# Patient Record
Sex: MALE | Race: WHITE | NOT HISPANIC OR LATINO | URBAN - METROPOLITAN AREA
[De-identification: names, ages, dates, MRNs, and addresses within clinical notes are randomized per-mention and may not be internally consistent; named-entity substitution may affect disease eponyms.]

---

## 2024-06-10 PROBLEM — E66.3 OVERWEIGHT (BMI 25.0-29.9): Status: ACTIVE | Noted: 2024-06-10

## 2024-07-01 ENCOUNTER — LAB VISIT (OUTPATIENT)
Dept: LAB | Facility: HOSPITAL | Age: 74
End: 2024-07-01
Attending: NURSE PRACTITIONER
Payer: MEDICARE

## 2024-07-01 ENCOUNTER — OFFICE VISIT (OUTPATIENT)
Dept: NEUROLOGY | Facility: CLINIC | Age: 74
End: 2024-07-01
Payer: MEDICARE

## 2024-07-01 ENCOUNTER — TELEPHONE (OUTPATIENT)
Dept: NEUROLOGY | Facility: CLINIC | Age: 74
End: 2024-07-01
Payer: MEDICARE

## 2024-07-01 VITALS
RESPIRATION RATE: 17 BRPM | BODY MASS INDEX: 28.8 KG/M2 | WEIGHT: 190.06 LBS | SYSTOLIC BLOOD PRESSURE: 119 MMHG | HEART RATE: 59 BPM | DIASTOLIC BLOOD PRESSURE: 75 MMHG | HEIGHT: 68 IN

## 2024-07-01 DIAGNOSIS — R41.3 OTHER AMNESIA: ICD-10-CM

## 2024-07-01 DIAGNOSIS — R25.1 TREMOR OF RIGHT HAND: ICD-10-CM

## 2024-07-01 DIAGNOSIS — R25.1 TREMOR OF RIGHT HAND: Primary | ICD-10-CM

## 2024-07-01 DIAGNOSIS — R41.3 MEMORY LOSS: ICD-10-CM

## 2024-07-01 DIAGNOSIS — G20.C PARKINSONISM, UNSPECIFIED PARKINSONISM TYPE: ICD-10-CM

## 2024-07-01 DIAGNOSIS — G91.2 NPH (NORMAL PRESSURE HYDROCEPHALUS): ICD-10-CM

## 2024-07-01 DIAGNOSIS — G47.61 PLMD (PERIODIC LIMB MOVEMENT DISORDER): ICD-10-CM

## 2024-07-01 LAB
AMMONIA PLAS-SCNC: 20 UMOL/L (ref 10–50)
FOLATE SERPL-MCNC: 26.2 NG/ML (ref 4–24)
HIV 1+2 AB+HIV1 P24 AG SERPL QL IA: NORMAL

## 2024-07-01 PROCEDURE — 82525 ASSAY OF COPPER: CPT | Performed by: NURSE PRACTITIONER

## 2024-07-01 PROCEDURE — 99999 PR PBB SHADOW E&M-EST. PATIENT-LVL IV: CPT | Mod: PBBFAC,,, | Performed by: NURSE PRACTITIONER

## 2024-07-01 PROCEDURE — 82140 ASSAY OF AMMONIA: CPT | Performed by: NURSE PRACTITIONER

## 2024-07-01 PROCEDURE — 82746 ASSAY OF FOLIC ACID SERUM: CPT | Performed by: NURSE PRACTITIONER

## 2024-07-01 PROCEDURE — 83921 ORGANIC ACID SINGLE QUANT: CPT | Performed by: NURSE PRACTITIONER

## 2024-07-01 PROCEDURE — 86592 SYPHILIS TEST NON-TREP QUAL: CPT | Performed by: NURSE PRACTITIONER

## 2024-07-01 PROCEDURE — 87389 HIV-1 AG W/HIV-1&-2 AB AG IA: CPT | Performed by: NURSE PRACTITIONER

## 2024-07-01 PROCEDURE — 99204 OFFICE O/P NEW MOD 45 MIN: CPT | Mod: S$PBB,,, | Performed by: NURSE PRACTITIONER

## 2024-07-01 PROCEDURE — 84425 ASSAY OF VITAMIN B-1: CPT | Performed by: NURSE PRACTITIONER

## 2024-07-01 PROCEDURE — 99214 OFFICE O/P EST MOD 30 MIN: CPT | Mod: PBBFAC,PO | Performed by: NURSE PRACTITIONER

## 2024-07-01 NOTE — PROGRESS NOTES
NEUROLOGY  Outpatient Consultation Visit     [facility]  [address] [facility], [place] [address]  [phone] (office) / [phone] (fax)    Patient Name:  Donaldo Ferrara  :  1950  MR #:  [id number]  Acct #:  [id number]    Date of Neurology Consult: 2024  Name of Provider: JUAN CARLOS Tolbert    Other Physicians:  John Purdy MD (Primary Care Physician); John Purdy MD (Referring)      Chief Complaint: Tremors      History of Present Illness (HPI):  Donaldo Ferrara is a right handed  74 y.o. male with a PMHX of HLD, MCI, NPH s/p shunt in May, ,     Patient presents today for tremor. It is to his right hand and started ~ . The tremor occurs at rest and with action. It has occurred when he uses his hand for instance, when driving or performing an action. There is no family history of tremor. He does not drink alcohol or caffeine. He hasn't noticed if his writing is affected by the tremor.    Previous records reviewed and it was originally believed that his tremor began after the initiation of Celexa.   He was also diagnosed with PLMS  and started on CD/LD 25/100 at night only in . This reportedly did offer a lot of aid to his sleep. At one point CD/LD was increased to 50/200 at bedtime. This also reportedly aided in his RUE tremor. At one point in  he was also prescribed Klonopin for RLS. He is no longer taking this med as it was causing nightmares.     Both patient and spouse didn't notice a change in the tremor while on CD/LD despite what previous Neuro records report. Previous neuro notes did report that he was also taking Aricept at one point which enhanced vivid dreams.     Of note, he is s/p shunt in May by Dr. Martinez. Spouse states he was having urinary incontinence and shuffling gait. Since the shunt he has had improvement in these symptoms.     He also reportedly had cognitive testing by previous neurologist and was told he had moderate  "neurocognitive disorder. He was placed on Namenda in the last year by his this neurologist. He does fall asleep easily. He thinks he sleeps well at night. He did have a sleep study in the past and it was reportedly normal. He does endorse vivid dreams. The RBD has improved since coming off the CD/LD but he continues to have it on occasion.  He fell out of his bed in March due to RBD.   Once in a while he may think he hears a noise but denies visual hallucinations.  He has been in therapy for the last 2-3 weeks for balance and gait training.         Past Medical, Surgical, Family & Social History:   Past Medical History:   Diagnosis Date    a Family H/O Early CAD     On ASA 81 Mg Daily; His Sister And Brother    c Hypercholesterolemia     On Zocor 20 Mg qHS    d Family H/O Diabetes Mellitus     His Sister And Brother    f Overweight     j Diverticulosis     j H/O Colon Polyp On 2019 Colonoscopy     k Benign Prostatic Hyperplasia     m Impaired balance     6/10/24 Referred To [facility]patient PT    m Mild Cognitive Impairment     6/10/24 ReferredTo [facility]; On Namenda For This    m Normal Pressure Hydrocephalus S/P  Shunt In 05/2024     Dr. Niranjan Martinez On 5/20/24 With A "MedTronic StrataMR II Adjustable Valve" At [facility] In [place]    m Restless Legs Syndrome     m Right Hand Tremor     6/10/24 ReferredTo [facility]; Better After His  Shunt    n Dysthymia     On Celexa 20 Mg Daily    q Eczema      History reviewed. No pertinent surgical history.  No family history on file.  Alcohol use:  has no history on file for alcohol use.   (Of note, 0.6 oz = 1 beer or 6 oz = 10 beers).  Tobacco use:  reports that he quit smoking about 20 years ago. His smoking use included cigarettes. He has never used smokeless tobacco.  Street drug use:  has no history on file for drug use.  Allergies: Patient has no known allergies..    Home Medications:     Current Outpatient Medications:     " "aspirin (ECOTRIN) 81 MG EC tablet, Take 1 tablet (81 mg total) by mouth once daily., Disp: , Rfl:     citalopram (CELEXA) 20 MG tablet, Take 1 tablet (20 mg total) by mouth once daily. TAKES 1.5 TABLETS DAILY, Disp: 90 tablet, Rfl: 3    memantine (NAMENDA) 10 MG Tab, Take 1 tablet (10 mg total) by mouth 2 (two) times daily., Disp: 180 tablet, Rfl: 3    multivitamin with minerals tablet, Take 1 tablet by mouth once daily., Disp: , Rfl:     simvastatin (ZOCOR) 20 MG tablet, Take 1 tablet (20 mg total) by mouth once daily., Disp: 90 tablet, Rfl: 3    Physical Examination:  /75 (BP Location: Left arm, Patient Position: Sitting, BP Method: Small (Automatic))   Pulse (!) 59   Resp 17   Ht 5' 8" (1.727 m)   Wt 86.2 kg (190 lb 0.6 oz)   BMI 28.89 kg/m²     GENERAL:  General appearance: Well, non-toxic appearing.  No apparent distress.  Neck: supple.    MENTAL STATUS:  Alertness, attention span & concentration: normal.  Language: normal.  Orientation to self, place & time:  normal.  Memory, recent & remote: normal.  Fund of knowledge: normal.      SPEECH:  Clear and fluent.   - monotone  Follows complex commands.      CRANIAL NERVES:  Cranial Nerves II-XII were examined.  II - Visual fields: normal.  III, IV, VI: PERRL, EOMI, No ptosis, No nystagmus.  V - Facial sensation: normal.  VII - Face symmetry & mobility: normal.  VIII - Hearing: normal  IX, X - Palate: mobile & midline.  XI - Shoulder shrug: normal.  XII - Tongue protrusion: normal.        GROSS MOTOR:  Gait & station: able to rise from chair with arms crossed over chest  Tone: no cogwheel to arms; unable to relax legs  Arms to Core: no tremor   Arms extended: no tremor  Abnormal movements: none.  Finger-nose: normal.  Rapid alternating movements: no decrementing finger or foot taps; very mild tremor to right hand during left finger taps.   Pronator drift: normal      MUSCLE STRENGTH:   RIGHT    LEFT   5 Neck Ext. 5   5 Neck Flex 5   5 Deltoids 5   5 " Biceps 5   5 Triceps 5   5 Forearm.Pr. 5        5 Iliopsoas flex    5   5 Hip Abduct 5   5 Hip Adduct 5   5 Quads 5   5 Hams 5   5 Dorsiflex 5   5 Plantar Flex 5     REFLEXES:    RIGHT Reflex   LEFT   2+ Biceps 2+   2+ Brachiorad. 2+        2+ Patellar 2+     SENSORY:  Light touch: Normal throughout.          III.  MOTOR EXAMINATION        Speech  2 - Monotone, slurred but understandable; moderately impaired.   Facial Expression  0 - Normal.   Tremor at Rest:      Face, lips, chin 0 - Absent.    Hands:      right 0 - Absent.    left 0 - Absent.    Feet:      right 0 - Absent.    left 0 - Absent.    Action or Postural Tremor of Hands      right 0 - Absent.    left 0 - Absent.    Rigidity      Neck 0 - Absent.   Upper Extremity: Right 0 - Absent.   Upper Extremity: Left 0 - Absent.   Lower Extremity: Right 1 - Slight or detectable only when activated by mirror or other movements.   Lower Extremity: Left 1 - Slight or detectable only when activated by mirror or other movements.   Finger Taps      right 0 - Normal.   left 0 - Normal.   Hand Movements      right 0 - Normal.   left 0 - Normal.   Rapid Alternating Movements of Hands      right 0 - Normal.   left 0 - Normal.   Leg Agility      right 0 - Normal.   left 0 - Normal.   Arising from Chair  0 - Normal.   Posture  0 - Normal erect.   Gait  0 - Normal.   Postural Stability (Response to sudden, strong posterior displacement produced by pull on shoulders while patient erect with eyes open and feet slightly apart. Patient is prepared, and can have had some practice runs.)  0 - Normal.   Body Bradykinesia and Hypokinesia (Combining slowness, hesitancy, decreased armswing, small amplitude, and poverty of movement in general)  1 - Minimal slowness, giving movement a deliberate character; could be normal for some persons. Possibly reduced amplitude.               Diagnostic Data Reviewed:   I have personally reviewed provider notes, labs and imaging made available to me  "today.     Imaging:  MRI brain 2021 (outside):  FINDINGS:   Brain: The brain is normal in morphology and signal intensity for the   patient's age. There is a mild amount of non-specific white matter T2   hyperintensity, likely related to chronic age-related small vessel changes.   No restricted diffusion.   Ventricular system: Normal.   Extra-axial spaces: Normal.   Posterior fossa: Normal.   Flow-voids: Normal.   Calvarium and skull base: No significant abnormalities.   Orbits: The globes are normal in appearance. No abnormality of the optic   nerve sheath or extraocular muscles. The lacrimal gland is unremarkable. The   cavernous sinus regions appear normal.   Paranasal sinuses and extracranial soft tissues: Unremarkable.   Following contrast administration, there is no pathological enhancement.     Trino scan 4/2024:  Normal (see encounters for full report)    Cardiac:  CUS 6/2024:  Impression:     No evidence of a hemodynamically significant carotid stenosis.  Labs:  No results found for: "WBC", "HGB", "HCT", "PLT", "MCV", "RDW"  No results found for: "NA", "K", "CL", "CO2", "BUN", "CREATININE", "GLU", "CALCIUM", "MG", "PHOS"  No results found for: "PROT", "ALBUMIN", "BILITOT", "AST", "ALKPHOS", "ALT", "GGT"  No results found for: "INR", "PROTIME", "PTT"  No results found for: "CHOL", "HDL", "LDLCALC", "TRIG", "CHOLHDL"  No results found for: "LABA1C", "HGBA1C"   No results found for: "KABSHGZE83"  Lab Results   Component Value Date    FOLATE 26.2 (H) 07/01/2024     No results found for: "TSH"  No results found for: "VITAMINB1"  Lab Results   Component Value Date    RPR Non-reactive 07/01/2024     No results found for: "PRICILA"  No components found for: "HEPATITISCANTIBODY"  No components found for: "HIV 1/2 AG/AB"  No results found for: "CRP"  No components found for: "SEDIMENTATIONRATE"      Assessment and Plan:  Donaldo Ferrara is a 74 y.o. male.      Problem List Items Addressed This Visit          Neuro    Right " Hand Tremor - Primary    Current Assessment & Plan     Reports of right hand tremor dating back to ~ 2018   - intermittent, sporadic; occurs with action and rest    Previous neuro records report initiation of CD/LD in 2020 to aid with periodic limb movement. This was eventually increased overtime to also aid in hand tremor. CD/LD was reportedly discontinued ~ 2023  Neuro exam today without tremor appreciated.   Etiology unclear   - Parkinsonism may be in DDX (mild bradykinesias and monotone voice).  Recent Trino scan normal   It is possible that other Dx may mimic PD-isms   - NPH s/p shunt placement in May 2024   - neurodegenerative disorder?  Hold on medication for now as tremor does not impact ADLs   - can consider gold standard ET med?           Normal Pressure Hydrocephalus S/P  Shunt In 05/2024    Current Assessment & Plan     Improvement of shuffling gait and urinary incontinence          Parkinsonism, unspecified Parkinsonism type    Memory loss    Current Assessment & Plan     Reported to have cognitive impairment by previous neurologist  Little improvement in cognition following shunt placement  No reports of behavorial changes, hallucinations. He does have a history of depression and takes Celexa. He also has been diagnosed with PLMS as well as RBD.   Will complete formal memory eval at next visit  In meantime, obtain updated brain imaging, serologies and referral placed to Neuro psych for further testing.   Pt was tried on Aricept in the past but was intolerable to it (vivid dreams)  Now maintained on Namenda   - continue             Other    PLMD (periodic limb movement disorder)    Current Assessment & Plan       Has been tried on CD/LD and klonopin. Currently not on either  PSG?  Consider Vickie or Lyrica             Other Visit Diagnoses       Other amnesia                          Important to note, also  has a past medical history of a Family H/O Early CAD, c Hypercholesterolemia, d Family H/O  Diabetes Mellitus, f Overweight, j Diverticulosis, j H/O Colon Polyp On 2019 Colonoscopy, k Benign Prostatic Hyperplasia, m Impaired balance, m Mild Cognitive Impairment, m Normal Pressure Hydrocephalus S/P  Shunt In 05/2024, m Restless Legs Syndrome, m Right Hand Tremor, n Dysthymia, and q Eczema.            The patient will return to clinic in 2 months.        All questions were answered and patient is comfortable with the plan.       Thank you very much for the opportunity to assist in this patient's care.    If you have any questions or concerns, please do not hesitate to contact me at any time.    Sincerely,     JUAN CARLOS Tolbert  [facility] [place]         I spent a total of 50 minutes on the day of the visit.This includes face to face time and non-face to face time preparing to see the patient (eg, review of tests), Obtaining and/or reviewing separately obtained history, Documenting clinical information in the electronic or other health record, Independently interpreting resultsand communicating results to the patient/family/caregiver, or Care coordination.

## 2024-07-02 LAB — RPR SER QL: NORMAL

## 2024-07-03 ENCOUNTER — PATIENT MESSAGE (OUTPATIENT)
Dept: NEUROLOGY | Facility: CLINIC | Age: 74
End: 2024-07-03
Payer: MEDICARE

## 2024-07-05 LAB
COPPER SERPL-MCNC: 1035 UG/L (ref 665–1480)
METHYLMALONATE SERPL-SCNC: 0.26 UMOL/L

## 2024-07-08 ENCOUNTER — HOSPITAL ENCOUNTER (OUTPATIENT)
Dept: RADIOLOGY | Facility: HOSPITAL | Age: 74
Discharge: HOME | End: 2024-07-08
Attending: NURSE PRACTITIONER
Payer: MEDICARE

## 2024-07-08 DIAGNOSIS — Z98.2 S/P VP SHUNT: ICD-10-CM

## 2024-07-08 DIAGNOSIS — R41.3 OTHER AMNESIA: ICD-10-CM

## 2024-07-08 DIAGNOSIS — R41.3 MEMORY LOSS: ICD-10-CM

## 2024-07-08 PROBLEM — G47.61 PLMD (PERIODIC LIMB MOVEMENT DISORDER): Status: ACTIVE | Noted: 2024-07-08

## 2024-07-08 PROBLEM — G20.C PARKINSONISM, UNSPECIFIED PARKINSONISM TYPE: Status: ACTIVE | Noted: 2024-07-08

## 2024-07-08 PROCEDURE — 76000 FLUOROSCOPY <1 HR PHYS/QHP: CPT | Mod: TC,PO

## 2024-07-08 PROCEDURE — 70551 MRI BRAIN STEM W/O DYE: CPT | Mod: TC,PO

## 2024-07-08 PROCEDURE — 70551 MRI BRAIN STEM W/O DYE: CPT | Mod: 26,,, | Performed by: RADIOLOGY

## 2024-07-08 NOTE — ASSESSMENT & PLAN NOTE
Has been tried on CD/LD and klonopin. Currently not on either  PSG?  Consider Vickie or Lyrica

## 2024-07-08 NOTE — ASSESSMENT & PLAN NOTE
Reports of right hand tremor dating back to ~ 2018   - intermittent, sporadic; occurs with action and rest    Previous neuro records report initiation of CD/LD in 2020 to aid with periodic limb movement. This was eventually increased overtime to also aid in hand tremor. CD/LD was reportedly discontinued ~ 2023  Neuro exam today without tremor appreciated.   Etiology unclear   - Parkinsonism may be in DDX (mild bradykinesias and monotone voice).  Recent Trino scan normal   It is possible that other Dx may mimic PD-isms   - NPH s/p shunt placement in May 2024   - neurodegenerative disorder?  Hold on medication for now as tremor does not impact ADLs   - can consider gold standard ET med?

## 2024-07-08 NOTE — ASSESSMENT & PLAN NOTE
Reported to have cognitive impairment by previous neurologist  Little improvement in cognition following shunt placement  No reports of behavorial changes, hallucinations. He does have a history of depression and takes Celexa. He also has been diagnosed with PLMS as well as RBD.   Will complete formal memory eval at next visit  In meantime, obtain updated brain imaging, serologies and referral placed to Neuro psych for further testing.   Pt was tried on Aricept in the past but was intolerable to it (vivid dreams)  Now maintained on Namenda   - continue

## 2024-09-03 NOTE — PROGRESS NOTES
NEUROLOGY  Outpatient Follow Up    [facility]  [address], [place] [address]  [phone] (office) / [phone] (fax)    Patient Name:  Donaldo Ferrara  :  1950  MR #:  [id number]  Acct #:  [id number]    Date of Neurology Visit: 2024  Name of Provider: JUAN CARLOS Tolbert    Other Physicians:  John Purdy MD (Primary Care Physician); No ref. provider found (Referring)      Chief Complaint: Tremors      History of Present Illness (HPI):  Donaldo Ferrara is a right handed  74 y.o. male with a PMHX of HLD, MCI, NPH s/p shunt in May, ,      Patient presents today for tremor. It is to his right hand and started ~ . The tremor occurs at rest and with action. It has occurred when he uses his hand for instance, when driving or performing an action. There is no family history of tremor. He does not drink alcohol or caffeine. He hasn't noticed if his writing is affected by the tremor.    Previous records reviewed and it was originally believed that his tremor began after the initiation of Celexa.   He was also diagnosed with PLMS  and started on CD/LD 25/100 at night only in . This reportedly did offer a lot of aid to his sleep. At one point CD/LD was increased to 50/200 at bedtime. This also reportedly aided in his RUE tremor. At one point in  he was also prescribed Klonopin for RLS. He is no longer taking this med as it was causing nightmares.      Both patient and spouse didn't notice a change in the tremor while on CD/LD despite what previous Neuro records report. Previous neuro notes did report that he was also taking Aricept at one point which enhanced vivid dreams.      Of note, he is s/p shunt in May by Dr. Martinez. Spouse states he was having urinary incontinence and shuffling gait. Since the shunt he has had improvement in these symptoms.      He also reportedly had cognitive testing by previous neurologist and was told he had moderate  neurocognitive disorder. He was placed on Namenda in the last year by his this neurologist. He does fall asleep easily. He thinks he sleeps well at night. He did have a sleep study in the past and it was reportedly normal. He does endorse vivid dreams. The RBD has improved since coming off the CD/LD but he continues to have it on occasion.  He fell out of his bed in March due to RBD.   Once in a while he may think he hears a noise but denies visual hallucinations.  He has been in therapy for the last 2-3 weeks for balance and gait training.              Interval Hx 9/4/2024:  Patient presents today for follow up. Overall patient has been feeling well. Tremor has not changed and is stable since last evaluated. Tremor is mainly noticed when emotional. It continues not to impede on ADLs. He continues to take Celexa which is offering aid in his mood.   He just finished PT for balance. He is now doing home exercises.   There are no reported hallucinations or RBD. He fell out of his bed in March due to RBD but no events since. Spouse states this has improved significantly.                  Past Medical, Surgical, Family & Social History:   Reviewed and updated.    Home Medications:     Current Outpatient Medications:     aspirin (ECOTRIN) 81 MG EC tablet, Take 1 tablet (81 mg total) by mouth once daily., Disp: , Rfl:     ciclopirox 1 % shampoo, Apply topically every evening., Disp: , Rfl:     citalopram (CELEXA) 20 MG tablet, Take 1 tablet (20 mg total) by mouth once daily. TAKES 1.5 TABLETS DAILY, Disp: 90 tablet, Rfl: 3    ketoconazole (NIZORAL) 2 % shampoo, Apply topically twice a week., Disp: , Rfl:     Lactobacillus rhamnosus GG (CULTURELLE) 10 billion cell capsule, 1 capsule every day by oral route., Disp: , Rfl:     memantine (NAMENDA) 10 MG Tab, Take 1 tablet (10 mg total) by mouth 2 (two) times daily., Disp: 180 tablet, Rfl: 3    multivitamin with minerals tablet, Take 1 tablet by mouth once daily., Disp: , Rfl:  "    cc-uj-zc5-dha-epa-fish-lut-kavita (OCUVITE ADULT 50 PLUS) 250 mg (90 mg-160 mg) Cap, , Disp: , Rfl:     simvastatin (ZOCOR) 20 MG tablet, Take 1 tablet (20 mg total) by mouth once daily., Disp: 90 tablet, Rfl: 3    Physical Examination:  /71 (BP Location: Left arm, Patient Position: Sitting, BP Method: Medium (Automatic))   Pulse 71   Resp 17   Ht 5' 8" (1.727 m)   Wt 86.3 kg (190 lb 2.4 oz)   BMI 28.91 kg/m²     GENERAL:  General appearance: Well, non-toxic appearing.  No apparent distress.  Neck: supple.     MENTAL STATUS:  Alertness, attention span & concentration: normal.  Language: normal.  Orientation to self, place & time:  normal.  Memory, recent & remote: normal.  Fund of knowledge: normal.        SPEECH:  Clear and fluent.   - monotone  Follows complex commands.        CRANIAL NERVES:  Cranial Nerves II-XII were examined.  II - Visual fields: normal.  III, IV, VI: PERRL, EOMI, No ptosis, No nystagmus.  V - Facial sensation: normal.  VII - Face symmetry & mobility: normal.  VIII - Hearing: normal  IX, X - Palate: mobile & midline.  XI - Shoulder shrug: normal.  XII - Tongue protrusion: normal.           GROSS MOTOR:  Gait & station: able to rise from chair with arms crossed over chest; slightly stooped, good arm swing  Tone: no cogwheel to arms but not able to fully relax; unable to relax legs  Arms to Core: no tremor   Arms extended: no tremor  Abnormal movements: none.  Finger-nose: normal.  Rapid alternating movements: no decrementing finger or foot taps; very mild tremor to right hand during left finger taps.   Pronator drift: normal        MUSCLE STRENGTH:   RIGHT      LEFT   5 Neck Ext. 5   5 Neck Flex 5   5 Deltoids 5   5 Biceps 5   5 Triceps 5   5 Forearm.Pr. 5           5 Iliopsoas flex    5   5 Hip Abduct 5   5 Hip Adduct 5   5 Quads 5   5 Hams 5   5 Dorsiflex 5   5 Plantar Flex 5      REFLEXES:     RIGHT Reflex    LEFT   2+ Biceps 2+   2+ Brachiorad. 2+           2+ Patellar 2+    "   SENSORY:  Light touch: Normal throughout.              III.  MOTOR EXAMINATION         Speech  2 - Monotone, slurred but understandable; moderately impaired.   Facial Expression  0 - Normal.   Tremor at Rest:      Face, lips, chin 0 - Absent.    Hands:      right 0 - Absent.    left 0 - Absent.    Feet:      right 0 - Absent.    left 0 - Absent.    Action or Postural Tremor of Hands      right 0 - Absent.    left 0 - Absent.    Rigidity      Neck 0 - Absent.   Upper Extremity: Right 0 - Absent.   Upper Extremity: Left 0 - Absent.   Lower Extremity: Right 1 - Slight or detectable only when activated by mirror or other movements.   Lower Extremity: Left 1 - Slight or detectable only when activated by mirror or other movements.   Finger Taps      right 0 - Normal.   left 0 - Normal.   Hand Movements      right 0 - Normal.   left 0 - Normal.   Rapid Alternating Movements of Hands      right 0 - Normal.   left 0 - Normal.   Leg Agility      right 0 - Normal.   left 0 - Normal.   Arising from Chair  0 - Normal.   Posture  0 - Normal erect.   Gait  0 - Normal.   Postural Stability (Response to sudden, strong posterior displacement produced by pull on shoulders while patient erect with eyes open and feet slightly apart. Patient is prepared, and can have had some practice runs.)  0 - Normal.   Body Bradykinesia and Hypokinesia (Combining slowness, hesitancy, decreased armswing, small amplitude, and poverty of movement in general)  1 - Minimal slowness, giving movement a deliberate character; could be normal for some persons. Possibly reduced amplitude.               Diagnostic Data Reviewed:   I have personally reviewed provider notes, labs and imaging made available to me today.     Imaging:  MRI brain 2021 (outside):  FINDINGS:   Brain: The brain is normal in morphology and signal intensity for the   patient's age. There is a mild amount of non-specific white matter T2   hyperintensity, likely related to chronic  "age-related small vessel changes.   No restricted diffusion.   Ventricular system: Normal.   Extra-axial spaces: Normal.   Posterior fossa: Normal.   Flow-voids: Normal.   Calvarium and skull base: No significant abnormalities.   Orbits: The globes are normal in appearance. No abnormality of the optic   nerve sheath or extraocular muscles. The lacrimal gland is unremarkable. The   cavernous sinus regions appear normal.   Paranasal sinuses and extracranial soft tissues: Unremarkable.   Following contrast administration, there is no pathological enhancement.      Trino scan 4/2024:  Normal (see encounters for full report)     Cardiac:  CUS 6/2024:  Impression:     No evidence of a hemodynamically significant carotid stenosis.    Labs:  Lab Results   Component Value Date    WBC 6.74 07/10/2024    HGB 16.3 07/10/2024    HCT 48.5 07/10/2024     07/10/2024    MCV 92 07/10/2024    RDW 11.9 07/10/2024     Lab Results   Component Value Date     07/10/2024    K 4.5 07/10/2024     07/10/2024    CO2 28 07/10/2024    BUN 15 07/10/2024    CREATININE 1.09 07/10/2024     07/10/2024    CALCIUM 9.1 07/10/2024     Lab Results   Component Value Date    PROT 6.6 07/10/2024    ALBUMIN 4.0 07/10/2024    BILITOT 0.9 07/10/2024    AST 32 07/10/2024    ALKPHOS 96 07/10/2024    ALT 20 07/10/2024     No results found for: "INR", "PROTIME", "PTT"  Lab Results   Component Value Date    CHOL 123 07/10/2024    HDL 43 07/10/2024    LDLCALC 51.4 (L) 07/10/2024    TRIG 143 07/10/2024    CHOLHDL 35.0 07/10/2024     Lab Results   Component Value Date    HGBA1C 5.3 07/10/2024      Lab Results   Component Value Date    ZJAIZNPY51 588 07/10/2024     Lab Results   Component Value Date    FOLATE 26.2 (H) 07/01/2024     Lab Results   Component Value Date    TSH 2.540 07/10/2024     Lab Results   Component Value Date    RPR Non-reactive 07/01/2024     No results found for: "VITAMINB1"  No components found for: "HIV 1/2 " "AG/AB"                    Assessment and Plan:    Problem List Items Addressed This Visit          Neuro    Right Hand Tremor - Primary    Current Assessment & Plan     Reports of right hand tremor dating back to ~ 2018   - intermittent, sporadic; occurs with action and rest     Previous neuro records report initiation of CD/LD in 2020 to aid with periodic limb movement. This was eventually increased overtime to also aid in hand tremor. CD/LD was reportedly discontinued ~ 2023  Neuro exam today unchanged and without tremor appreciated.   Etiology unclear   - Parkinsonism may be in DDX (mild bradykinesias and monotone voice).  Recent Trino scan normal   It is possible that other Dx may mimic PD-isms   - NPH s/p shunt placement in May 2024   - neurodegenerative disorder?  Hold on medication for now as tremor does not impact ADLs   - can consider gold standard ET med?            Normal Pressure Hydrocephalus S/P  Shunt In 05/2024    Current Assessment & Plan     Improvement of shuffling gait and urinary incontinence          Parkinsonism, unspecified Parkinsonism type    Current Assessment & Plan     Reported to have cognitive impairment by previous neurologist  Little improvement in cognition following shunt placement  No reports of behavorial changes, hallucinations. He does have a history of depression and takes Celexa. He also has been diagnosed with PLMS as well as RBD.   Will complete formal memory eval at next visit  MRI brain scan noted with chronic lacune and mod microvascular ischemic changes  Serologies noted  Awaiting Neuro psych for further testing.   Pt was tried on Aricept in the past but was intolerable to it (vivid dreams)  Now maintained on Namenda   - continue                               Important to note, also  has a past medical history of a Family H/O Early CAD, a Mild Bilateral Carotid Artery Stenosis, c Hypercholesterolemia, d Family H/O Diabetes Mellitus, f Overweight, j Diverticulosis, j H/O " Colon Polyp On 2019 Colonoscopy, k Benign Prostatic Hyperplasia, m Impaired balance, m Mild Cognitive Impairment, m Normal Pressure Hydrocephalus S/P  Shunt In 05/2024, m Restless Legs Syndrome, m Right Hand Tremor, n Dysthymia, and q Eczema.          The patient will return to clinic in 1-2 months.    All questions were answered and patient is comfortable with the plan.         Thank you very much for the opportunity to assist in this patient's care.    If you have any questions or concerns, please do not hesitate to contact me at any time.      Sincerely,     JUAN CARLOS Tolbert  [facility] [place]         I spent a total of 31 minutes on the day of the visit.This includes face to face time and non-face to face time preparing to see the patient (eg, review of tests), Obtaining and/or reviewing separately obtained history, Documenting clinical information in the electronic or other health record, Independently interpreting resultsand communicating results to the patient/family/caregiver, or Care coordination.

## 2024-09-04 ENCOUNTER — OFFICE VISIT (OUTPATIENT)
Dept: NEUROLOGY | Facility: CLINIC | Age: 74
End: 2024-09-04
Payer: MEDICARE

## 2024-09-04 VITALS
BODY MASS INDEX: 28.81 KG/M2 | WEIGHT: 190.13 LBS | HEART RATE: 71 BPM | HEIGHT: 68 IN | SYSTOLIC BLOOD PRESSURE: 110 MMHG | DIASTOLIC BLOOD PRESSURE: 71 MMHG | RESPIRATION RATE: 17 BRPM

## 2024-09-04 DIAGNOSIS — R25.1 TREMOR OF RIGHT HAND: Primary | ICD-10-CM

## 2024-09-04 DIAGNOSIS — G91.2 NPH (NORMAL PRESSURE HYDROCEPHALUS): ICD-10-CM

## 2024-09-04 DIAGNOSIS — G20.C PARKINSONISM, UNSPECIFIED PARKINSONISM TYPE: ICD-10-CM

## 2024-09-04 PROCEDURE — 99214 OFFICE O/P EST MOD 30 MIN: CPT | Mod: S$PBB,,, | Performed by: NURSE PRACTITIONER

## 2024-09-04 PROCEDURE — 99214 OFFICE O/P EST MOD 30 MIN: CPT | Mod: PBBFAC,PO | Performed by: NURSE PRACTITIONER

## 2024-09-04 PROCEDURE — 99999 PR PBB SHADOW E&M-EST. PATIENT-LVL IV: CPT | Mod: PBBFAC,,, | Performed by: NURSE PRACTITIONER

## 2024-09-04 NOTE — ASSESSMENT & PLAN NOTE
Reports of right hand tremor dating back to ~ 2018   - intermittent, sporadic; occurs with action and rest     Previous neuro records report initiation of CD/LD in 2020 to aid with periodic limb movement. This was eventually increased overtime to also aid in hand tremor. CD/LD was reportedly discontinued ~ 2023  Neuro exam today unchanged and without tremor appreciated.   Etiology unclear   - Parkinsonism may be in DDX (mild bradykinesias and monotone voice).  Recent Trino scan normal   It is possible that other Dx may mimic PD-isms   - NPH s/p shunt placement in May 2024   - neurodegenerative disorder?  Hold on medication for now as tremor does not impact ADLs   - can consider gold standard ET med?

## 2024-09-04 NOTE — ASSESSMENT & PLAN NOTE
Reported to have cognitive impairment by previous neurologist  Little improvement in cognition following shunt placement  No reports of behavorial changes, hallucinations. He does have a history of depression and takes Celexa. He also has been diagnosed with PLMS as well as RBD.   Will complete formal memory eval at next visit  MRI brain scan noted with chronic lacune and mod microvascular ischemic changes  Serologies noted  Awaiting Neuro psych for further testing.   Pt was tried on Aricept in the past but was intolerable to it (vivid dreams)  Now maintained on Namenda   - continue

## 2024-10-28 ENCOUNTER — TELEPHONE (OUTPATIENT)
Dept: NEUROLOGY | Facility: CLINIC | Age: 74
End: 2024-10-28
Payer: MEDICARE

## 2024-11-12 ENCOUNTER — OFFICE VISIT (OUTPATIENT)
Dept: NEUROLOGY | Facility: CLINIC | Age: 74
End: 2024-11-12
Payer: MEDICARE

## 2024-11-12 VITALS
SYSTOLIC BLOOD PRESSURE: 116 MMHG | HEART RATE: 76 BPM | WEIGHT: 190.69 LBS | BODY MASS INDEX: 29 KG/M2 | DIASTOLIC BLOOD PRESSURE: 74 MMHG

## 2024-11-12 DIAGNOSIS — R41.3 MEMORY LOSS: Primary | ICD-10-CM

## 2024-11-12 PROCEDURE — 99999 PR PBB SHADOW E&M-EST. PATIENT-LVL IV: CPT | Mod: PBBFAC,,, | Performed by: NURSE PRACTITIONER

## 2024-11-12 PROCEDURE — 99214 OFFICE O/P EST MOD 30 MIN: CPT | Mod: PBBFAC,PO | Performed by: NURSE PRACTITIONER

## 2024-11-12 PROCEDURE — 99214 OFFICE O/P EST MOD 30 MIN: CPT | Mod: S$PBB,,, | Performed by: NURSE PRACTITIONER

## 2024-11-12 NOTE — PROGRESS NOTES
NEUROLOGY  Outpatient Follow Up    [facility]  [address] [facility], [place] [address]  [phone] (office) / [phone] (fax)    Patient Name:  Donaldo Ferrara  :  1950  MR #:  [id number]  Acct #:  [id number]    Date of Neurology Visit: 2024  Name of Provider: JUAN CARLOS Tolbert    Other Physicians:  John Purdy MD (Primary Care Physician); John Purdy MD (Referring)      Chief Complaint: Memory Loss      History of Present Illness (HPI):  Donaldo Ferrara is a right handed  74 y.o. male with a PMHX of HLD, MCI, NPH s/p shunt in May, ,      Patient presents today for tremor. It is to his right hand and started ~ . The tremor occurs at rest and with action. It has occurred when he uses his hand for instance, when driving or performing an action. There is no family history of tremor. He does not drink alcohol or caffeine. He hasn't noticed if his writing is affected by the tremor.    Previous records reviewed and it was originally believed that his tremor began after the initiation of Celexa.   He was also diagnosed with PLMS  and started on CD/LD 25/100 at night only in . This reportedly did offer a lot of aid to his sleep. At one point CD/LD was increased to 50/200 at bedtime. This also reportedly aided in his RUE tremor. At one point in  he was also prescribed Klonopin for RLS. He is no longer taking this med as it was causing nightmares.      Both patient and spouse didn't notice a change in the tremor while on CD/LD despite what previous Neuro records report. Previous neuro notes did report that he was also taking Aricept at one point which enhanced vivid dreams.      Of note, he is s/p shunt in May by Dr. Martinez. Spouse states he was having urinary incontinence and shuffling gait. Since the shunt he has had improvement in these symptoms.      He also reportedly had cognitive testing by previous neurologist and was told he had moderate  neurocognitive disorder. He was placed on Namenda in the last year by his this neurologist. He does fall asleep easily. He thinks he sleeps well at night. He did have a sleep study in the past and it was reportedly normal. He does endorse vivid dreams. The RBD has improved since coming off the CD/LD but he continues to have it on occasion.  He fell out of his bed in March due to RBD.   Once in a while he may think he hears a noise but denies visual hallucinations.  He has been in therapy for the last 2-3 weeks for balance and gait training.          Interval Hx 9/4/2024:  Patient presents today for follow up. Overall patient has been feeling well. Tremor has not changed and is stable since last evaluated. Tremor is mainly noticed when emotional. It continues not to impede on ADLs. He continues to take Celexa which is offering aid in his mood.   He just finished PT for balance. He is now doing home exercises.   There are no reported hallucinations or RBD. He fell out of his bed in March due to RBD but no events since. Spouse states this has improved significantly.        Interval Hx 11/12/2024:  Patient presents today for memory evaluatin. He is solo today. He reports forgetfulness. This comes and goes. He can't remember peoples names. He lives at home with spouse who has mentioned to him about poor memory.     Patient's highest level of education completed was 12 th grade and then trade school. He worked in the oil field, [employer] and [employer]. He suspects memory issues became more noticeable about 20 years ago. He struggles more with short term memory loss. He denies personality changes. He admits to intermittent depression.  He takes Celexa for mood. He denies hallucinations. He reports to having vivid dreams. This has been ongoing for the ~ 10-15 years. He also has been diagnosed with PLMS as well as RBD. He does get rest at night. He admits to daytime sleeping. His spouse is managing his medications and the  finances. He denies issues with hygiene and is independent with ADLs. He does have word finding difficulty. He has urinary urgency. He denies recent falls. He is still driving and denies recent MVAs or getting lost in familiar areas. He tries to keep busy with house chores and tries to exercise.   As per EMR he was tried on Aricept in the past but this reportedly made his vivid dreams worse. He is now taking Namenda. He believes his brother is showing signs of dementia. He denies a history of alcoholism and drug use.           Past Medical, Surgical, Family & Social History:   Reviewed and updated.    Home Medications:     Current Outpatient Medications:     aspirin (ECOTRIN) 81 MG EC tablet, Take 1 tablet (81 mg total) by mouth once daily., Disp: , Rfl:     ciclopirox 1 % shampoo, Apply topically every evening., Disp: 120 mL, Rfl: 10    citalopram (CELEXA) 20 MG tablet, Take 1 tablet (20 mg total) by mouth once daily. TAKES 1.5 TABLETS DAILY, Disp: 90 tablet, Rfl: 3    ketoconazole (NIZORAL) 2 % shampoo, Apply topically twice a week., Disp: 120 mL, Rfl: 10    Lactobacillus rhamnosus GG (CULTURELLE) 10 billion cell capsule, 1 capsule every day by oral route., Disp: , Rfl:     memantine (NAMENDA) 10 MG Tab, Take 1 tablet (10 mg total) by mouth 2 (two) times daily., Disp: 180 tablet, Rfl: 3    multivitamin with minerals tablet, Take 1 tablet by mouth once daily., Disp: , Rfl:     [id number]-dha-epa-fish-lut-kavita (OCUVITE ADULT 50 PLUS) 250 mg (90 mg-160 mg) Cap, , Disp: , Rfl:     simvastatin (ZOCOR) 20 MG tablet, Take 1 tablet (20 mg total) by mouth once daily., Disp: 90 tablet, Rfl: 3    Physical Examination:  /74 (BP Location: Right arm, Patient Position: Sitting)   Pulse 76   Wt 86.5 kg (190 lb 11.2 oz)   BMI 29.00 kg/m²               GENERAL:  General appearance: Well, non-toxic appearing.  No apparent distress.  Neck: supple.     MENTAL STATUS:  Alertness, attention span & concentration:  normal.  Language: normal.  Orientation to self, place & time:  normal.  Memory, recent & remote: normal.  Fund of knowledge: normal.  MMSE: 27/30     SPEECH:  Clear and fluent.   - monotone  Follows complex commands.        CRANIAL NERVES:  Cranial Nerves II-XII were examined.  II - Visual fields: normal.  III, IV, VI: PERRL, EOMI, No ptosis, No nystagmus.  V - Facial sensation: normal.  VII - Face symmetry & mobility: normal.  VIII - Hearing: normal  IX, X - Palate: mobile & midline.  XI - Shoulder shrug: normal.  XII - Tongue protrusion: normal.           GROSS MOTOR:  Gait & station: able to rise from chair with arms crossed over chest; slightly stooped, good arm swing and good step height  Tone: no cogwheel to arms but not able to fully relax; unable to relax legs  Arms to Core: no tremor   Arms extended: no tremor  Abnormal movements: none.  Finger-nose: normal.  Rapid alternating movements: no decrementing finger or foot taps; very mild tremor to right hand during left finger taps.   Pronator drift: normal        MUSCLE STRENGTH:   RIGHT      LEFT   5 Neck Ext. 5   5 Neck Flex 5   5 Deltoids 5   5 Biceps 5   5 Triceps 5   5 Forearm.Pr. 5           5 Iliopsoas flex    5   5 Hip Abduct 5   5 Hip Adduct 5   5 Quads 5   5 Hams 5   5 Dorsiflex 5   5 Plantar Flex 5      REFLEXES:     RIGHT Reflex    LEFT   2+ Biceps 2+   2+ Brachiorad. 2+           2+ Patellar 2+      SENSORY:  Light touch: Normal throughout.              III.  MOTOR EXAMINATION         Speech  2 - Monotone, slurred but understandable; moderately impaired.   Facial Expression  0 - Normal.   Tremor at Rest:      Face, lips, chin 0 - Absent.    Hands:      right 0 - Absent.    left 0 - Absent.    Feet:      right 0 - Absent.    left 0 - Absent.    Action or Postural Tremor of Hands      right 0 - Absent.    left 0 - Absent.    Rigidity      Neck 0 - Absent.   Upper Extremity: Right 0 - Absent.   Upper Extremity: Left 0 - Absent.   Lower Extremity:  Right 1 - Slight or detectable only when activated by mirror or other movements.   Lower Extremity: Left 1 - Slight or detectable only when activated by mirror or other movements.   Finger Taps      right 0 - Normal.   left 0 - Normal.   Hand Movements      right 0 - Normal.   left 0 - Normal.   Rapid Alternating Movements of Hands      right 0 - Normal.   left 0 - Normal.   Leg Agility      right 0 - Normal.   left 0 - Normal.   Arising from Chair  0 - Normal.   Posture  0 - Normal erect.   Gait  0 - Normal.   Postural Stability (Response to sudden, strong posterior displacement produced by pull on shoulders while patient erect with eyes open and feet slightly apart. Patient is prepared, and can have had some practice runs.)  0 - Normal.   Body Bradykinesia and Hypokinesia (Combining slowness, hesitancy, decreased armswing, small amplitude, and poverty of movement in general)  1 - Minimal slowness, giving movement a deliberate character; could be normal for some persons. Possibly reduced amplitude.               Diagnostic Data Reviewed:   I have personally reviewed provider notes, labs and imaging made available to me today.     Imaging:  MRI brain 7/2024:  FINDINGS:  Brain: Prominent artifact from the patient's right frontal approach programmable shunt catheter with its tip appearing to terminate within the right lateral ventricle.  No acute infarct, hemorrhage, midline shift or mass effect, or space-occupying extra-axial fluid collection allowing for limited visualization of the intracranial structures particularly on the right.  Patchy subcortical and confluent deep periventricular white matter signal changes consistent with moderate chronic small vessel ischemic and involutional changes.  Asymmetric volume loss/gliosis involving the anterior left temporal lobe which is nonspecific but could be correlated for any history of remote trauma.  Small area of gliosis/encephalomalacia within the right frontal lobe  associated with the traversing catheter.  Tiny chronic lacunar type infarct within the left cerebellum.  Midline Structures: The midline structures of the brain are normal.  Ventricles: No acute hydrocephalus.  Vasculature: The vascular flow voids at the base of the brain are within normal limits.  Calvarium: The visualized osseous structures are unremarkable.  Sinuses: The paranasal sinuses are adequately aerated.  Orbits: Ocular lens replacements.  Otherwise within normal limits.  Mastoids: The mastoid air cells are adequately aerated.  Extracranial soft tissues: The visualized extracranial soft tissues are unremarkable.     Impression:     1. No acute process identified noting this evaluation is somewhat limited secondary to extensive regional artifact from the patient's right frontal approach programmable shunt.  No acute hydrocephalus.  2. Chronic ischemic changes/volume loss as discussed above.       MRI brain 2021 (outside):  FINDINGS:   Brain: The brain is normal in morphology and signal intensity for the   patient's age. There is a mild amount of non-specific white matter T2   hyperintensity, likely related to chronic age-related small vessel changes.   No restricted diffusion.   Ventricular system: Normal.   Extra-axial spaces: Normal.   Posterior fossa: Normal.   Flow-voids: Normal.   Calvarium and skull base: No significant abnormalities.   Orbits: The globes are normal in appearance. No abnormality of the optic   nerve sheath or extraocular muscles. The lacrimal gland is unremarkable. The   cavernous sinus regions appear normal.   Paranasal sinuses and extracranial soft tissues: Unremarkable.   Following contrast administration, there is no pathological enhancement.      Trino scan 4/2024:  Normal (see encounters for full report)     Cardiac:  CUS 6/2024:  Impression:     No evidence of a hemodynamically significant carotid stenosis.    Labs:  Lab Results   Component Value Date    WBC 6.74 07/10/2024     "HGB 16.3 07/10/2024    HCT 48.5 07/10/2024     07/10/2024    MCV 92 07/10/2024    RDW 11.9 07/10/2024     Lab Results   Component Value Date     07/10/2024    K 4.5 07/10/2024     07/10/2024    CO2 28 07/10/2024    BUN 15 07/10/2024    CREATININE 1.09 07/10/2024     07/10/2024    CALCIUM 9.1 07/10/2024     Lab Results   Component Value Date    PROT 6.6 07/10/2024    ALBUMIN 4.0 07/10/2024    BILITOT 0.9 07/10/2024    AST 32 07/10/2024    ALKPHOS 96 07/10/2024    ALT 20 07/10/2024     No results found for: "INR", "PROTIME", "PTT"  Lab Results   Component Value Date    CHOL 123 07/10/2024    HDL 43 07/10/2024    LDLCALC 51.4 (L) 07/10/2024    TRIG 143 07/10/2024    CHOLHDL 35.0 07/10/2024     Lab Results   Component Value Date    HGBA1C 5.3 07/10/2024      Lab Results   Component Value Date    ALVMZLNA78 588 07/10/2024     Lab Results   Component Value Date    FOLATE 26.2 (H) 07/01/2024     Lab Results   Component Value Date    TSH 2.540 07/10/2024     Lab Results   Component Value Date    RPR Non-reactive 07/01/2024     No results found for: "VITAMINB1"  No components found for: "HIV 1/2 AG/AB"                    Assessment and Plan:        Problem List Items Addressed This Visit          Neuro    Memory loss - Primary    Current Assessment & Plan     Reported to have cognitive impairment by previous neurologist. Patient reports increased forgetfulness and occasional word finding difficulty  Little improvement in cognition following shunt placement  No reports of behavorial changes, hallucinations. He does have a history of depression and takes Celexa. He also has been diagnosed with PLMS as well as RBD.   MMSE today 27/30   - suspect mild cognitive impairment; vascular?  MRI brain scan noted with chronic lacune and mod microvascular ischemic changes; no acute hydrocephalus   Serologies noted  Awaiting Neuro psych later this month for further testing.   Pt was tried on Aricept in the past " but was intolerable to it (vivid dreams)  Now maintained on Namenda   - continue   Agree with continuing Celexa for mood   - ACB score 1  Consider PSG  Safety concerns addressed.   Pt was solo at today's visit                         Important to note, also  has a past medical history of a Family H/O Early CAD, a Mild Bilateral Carotid Artery Stenosis, c Hypercholesterolemia, d Family H/O Diabetes Mellitus, f Overweight, j Diverticulosis, j H/O Colon Polyp On 2019 Colonoscopy, k Benign Prostatic Hyperplasia, m Impaired balance, m Mild Cognitive Impairment, m Normal Pressure Hydrocephalus S/P  Shunt In 05/2024, m Restless Legs Syndrome, m Right Hand Tremor, n Dysthymia, and q Eczema.          The patient will return to clinic in 6 months.    All questions were answered and patient is comfortable with the plan.         Thank you very much for the opportunity to assist in this patient's care.    If you have any questions or concerns, please do not hesitate to contact me at any time.      Sincerely,     JUAN CARLOS Tolbert  [facility] [place]         I spent a total of 36 minutes on the day of the visit.This includes face to face time and non-face to face time preparing to see the patient (eg, review of tests), Obtaining and/or reviewing separately obtained history, Documenting clinical information in the electronic or other health record, Independently interpreting resultsand communicating results to the patient/family/caregiver, or Care coordination.

## 2024-11-12 NOTE — ASSESSMENT & PLAN NOTE
Reported to have cognitive impairment by previous neurologist. Patient reports increased forgetfulness and occasional word finding difficulty  Little improvement in cognition following shunt placement  No reports of behavorial changes, hallucinations. He does have a history of depression and takes Celexa. He also has been diagnosed with PLMS as well as RBD.   MMSE today 27/30   - suspect mild cognitive impairment; vascular?  MRI brain scan noted with chronic lacune and mod microvascular ischemic changes; no acute hydrocephalus   Serologies noted  Awaiting Neuro psych later this month for further testing.   Pt was tried on Aricept in the past but was intolerable to it (vivid dreams)  Now maintained on Namenda   - continue   Agree with continuing Celexa for mood   - ACB score 1  Consider PSG  Safety concerns addressed.   Pt was solo at today's visit

## 2024-11-20 ENCOUNTER — OFFICE VISIT (OUTPATIENT)
Dept: NEUROLOGY | Facility: CLINIC | Age: 74
End: 2024-11-20
Payer: MEDICARE

## 2024-11-20 DIAGNOSIS — G91.2 NPH (NORMAL PRESSURE HYDROCEPHALUS): ICD-10-CM

## 2024-11-20 DIAGNOSIS — Z87.898 HISTORY OF LEARNING DISABILITY: ICD-10-CM

## 2024-11-20 DIAGNOSIS — G20.C PARKINSONISM, UNSPECIFIED PARKINSONISM TYPE: ICD-10-CM

## 2024-11-20 DIAGNOSIS — G31.84 MCI (MILD COGNITIVE IMPAIRMENT): ICD-10-CM

## 2024-11-20 NOTE — PROGRESS NOTES
NEUROPSYCHOLOGY CONSULT (TELEHEALTH)    Referral Information  Name: Donaldo Ferrara  MRN: [id number]  : 1950  Age: 74 y.o.  Race: White  Gender: male  Referring Provider: Jeanie Harris FNP   Billing: See below for details as coding/billing has changed   Telemedicine:   The patient location is: [place], [place]  The provider location is: [place], [place]  The chief complaint leading to consultation/medical necessity is: Cognitive concerns  Visit type: Virtual visit with synchronous audio only (telephone)  Total time spent with patient: 45 minutes  The reason for the audio only service rather than synchronous audio and video virtual visit was related to technical difficulties or patient preference/necessity.     Each patient to whom I provide medical services by telemedicine is:  (1) informed of the relationship between the physician and patient and the respective role of any other health care provider with respect to management of the patient; and (2) notified that they may decline to receive medical services by telemedicine and may withdraw from such care at any time. Patient verbally consented to receive this service via voice-only telephone call.    This service was not originating from a related E/M service provided within the previous 7 days nor will  to an E/M service or procedure within the next 24 hours or my soonest available appointment.  Prevailing standard of care was able to be met in this audio-only visit.    Consent/Emergency Plan: The patient expressed an understanding of the purpose of the evaluation and consented to all procedures. I informed the patient of limits to confidentiality and discussed an emergency plan.    SUMMARY/TREATMENT PLAN   Results from the interview indicate the following diagnoses and treatment plan recommendations. The patient is likely able to follow a treatment plan without help from family.    Diagnoses/Plan:  1. Mild Cognitive Impairment    2. Normal Pressure  Hydrocephalus S/P  Shunt In 05/2024    3. Parkinsonism, unspecified Parkinsonism type    4. History of learning disability    Summary/Recommendations:  Mr. Ferrara is followed by Neurology for history of tremor, periodic limb movement disorder, and REM sleep behavior disorder. He has a diagnosis of Normal Pressure Hydrocephalus, with shunt placement in May 2024 and some improvement in cognition after lumbar puncture. During the interview, he reported longstanding learning difficulties and generally stable cognition over time. He denied major neuropsychiatric concerns and reported occasional depressed mood and anxiety. He is as independent in basic and instrumental activities of daily living as he has been historically.    Mr. Ferrara will complete a neuropsychological evaluation on 12/05/2024.    Thank you for allowing me to participate in Mr. Ferrara's care.  If you have any questions, please contact me at [phone].     Nelli Durán, Ph.D., ABPP  Board Certified in Clinical Neuropsychology  [facility] - Department of Neurology    HISTORY OF PRESENT ILLNESS AND CURRENT SYMPTOMS     Mr. Ferrara has active problems noted below. He initially visited Neurology on 07/01/2024, reporting tremor in his right hand, starting in 2018, and diagnoses of periodic limb movement disorder and REM sleep behavior disorder. He and his wife also reported prior assessment by neurology and diagnosis of moderate neurocognitive disorder, with prescription for Namenda. Formal assessment of mental status was not completed, but general observations were within normal limits, with the exception of monotone voice. He also has a diagnosis of normal pressure hydrocephalus, with shunt placement in May 2024 and reported improvement in cognition post-shunting. He completed physical therapy for balance, and did not report significant changes during follow-up in September 2024. Physical exam suggested slightly stooped gait but good arm swing,  "with difficulty relaxing his arms and legs and very mild tremor of his right hand during left finger taps. Specific motor exam suggested monotone speech with slurred but understandable production, slight rigidity in his right and left lower extremities, and minimal slowness in movement. During follow-up in November 2024, he reported forgetfulness. Performance on a brief mental status measure was largely within normal limits (Mini Mental Status Exam [MMSE]=27/30). This was a relative improvement from screening on a different measure in April 2024, prior to shunt placement ([place] Cognitive Assessment [MoCA]=17/30) and consistent with performance post-lumbar puncture (MoCA=22/30).    Cognitive Symptoms:  Type/Examples:   Attention: He reported slight changes in attention.  Mental Speed: He reported slowing in his thinking and taking longer for words to come out.  Memory: Mr. Ferrara reported "a little bit" of memory difficulty. Reminders help some. He is not forgetting events.  Language: He reported word-finding difficulty that is longstanding. He denied comprehension difficulty for oral information; reading difficulty is longstanding.  Visuospatial/Perceptual: He reported occasionally getting lost in familiar places. He denied difficulty using tools or appliances.  Executive Functioning: He reported he may forget plans. He sometimes misplaces items.  Onset: He reported longstanding learning difficulties, with greater difficulty when computers became more common. Records noted onset of mood and memory changes in 2018.  Course: Stable, per Mr. Ferrara. Records noted improvement with lumbar puncture and reported improvement post-shunting.    Current Functional Status/Needs:  ADLs  Self-Care Eating Safety Other   Independent Independent Independent      Instrumental IADLs:   Driving Medications/Health Household Finances   Independent. He said his wife would say he gets lost; he does not think he does. Wife helps, " "longstanding Independent, no difficulty Wife manages, longstanding     Psychiatric/Behavioral Symptoms:  Mood:  Depression/Dysphoria Anxiety/Fearfulness Irritability   He reported feeling sad, "once in a, when the moon turns blue."  He reported "a little bit" of anxiety when having to talk with people. He denied general anxiety. He reported occasional irritability but denied a change.     Behavior:  Agitation/Resistance Delusions/Paranoia Hallucinations   None reported None reported None reported     Apathy/Motivation Repetitive/Restlessness Other   None reported None reported      Neurovegetative:  Sleep/Nighttime  [employer]   Records noted REM sleep behavior disorder. He has vivid dreams. He reported sleep has been good lately, and he sleeps on average 8 hours per night. No changes noted He tries to stay busy. He does yard work and walks almost every day (1-2 miles). He is doing physical therapy exercises at home.     Suicidal/Homicidal Ideation: He reported some thoughts about "what am I doing here." This is rare. No homicidal ideation reported.    Physical Symptoms: See Neurology notes for detailed physical symptoms. He reported balance is better with shunt placement and physical therapy; incontinence also improved. He reported his vision is good; he just had cataract surgery. He wears hearing aids. He denied problems with blood pressure management, dizziness, or fainting.      PERTINENT BACKGROUND INFORMATION   SOCIAL HISTORY    Family Status:    Current Living Situation: lives with wife and dog; moved to [place] in February or March 2024  Primary Source of Support: wife  Daily Activities: plays puzzles on his iPad; yardwork  Stressors: none reported  Other Factors:  Educational Level: He reported having "dementia all my life," stating he sees things backwards. He agreed when asked if he meant to say "dyslexia." He repeated 2nd, 3rd, and 4th grades. He received extra help in some of his classes " and was in smaller classes in elementary school. He had remedial math and reading. He was not diagnosed but described symptoms consistent with learning disabilities in reading and writing, possibly math. He graduated from high school at age 19.  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 He was a member of the [employer] crew and traveled to the [facility]. He served for 4 years and had an honorable discharge.  Other: He was unaware of any developmental delays; he had measles as a child.    No family history on file.  Family Neurologic History: He believes his brother is showing signs of dementia (late 70s)  Family Psychiatric History: Negative for heritable risk factors    MEDICAL STATUS  Patient Active Problem List   Diagnosis    Hypercholesterolemia    Mild Cognitive Impairment    Right Hand Tremor    Normal Pressure Hydrocephalus S/P  Shunt In 05/2024    Restless Legs Syndrome    Impaired Balance    Diverticulosis    H/O Colon Polyp On 2019 Colonoscopy    Dysthymia    Overweight (BMI 25.0-29.9)    Benign Prostatic Hyperplasia    Eczema    Parkinsonism, unspecified Parkinsonism type    Memory loss    PLMD (periodic limb movement disorder)    Mild Bilateral Carotid Artery Stenosis    History of learning disability     Past Medical History:   Diagnosis Date    a Family H/O Early CAD     On ASA 81 Mg Daily; His Sister And Brother    a Mild Bilateral Carotid Artery Stenosis     Will Repeat A Carotid U/S In 3 Years; 6/26/24 Bilateral Carotid AA U/S = Mild BICA Disease (See Report)    c Hypercholesterolemia     On Zocor 20 Mg qHS    d Family H/O Diabetes Mellitus     His Sister And Brother    f Overweight     j Diverticulosis     j H/O Colon Polyp On 2019 Colonoscopy     k Benign Prostatic  "Hyperplasia     m Impaired balance     6/10/24 Referred To [facility] Outpatient [facility]    m Mild Cognitive Impairment     6/10/24 [facility]; On Namenda For This    m Normal Pressure Hydrocephalus S/P  Shunt In 05/2024     Dr. Niranjan Martinez On 5/20/24 With A "MedTronic StrataMR II Adjustable Valve" At [facility] In [place]    m Restless Legs Syndrome     m Right Hand Tremor     6/10/24 ReferredTo [facility]; Better After His  Shunt    n Dysthymia     On Celexa 20 Mg Daily    q Eczema      Past Surgical History:   Procedure Laterality Date    INSERTION OF SHUNT Right      Updated/Relevant Neurologic History:  Falls: He fell out of bed when sleeping in March 2024  TBI: He hit his face when he fell out of bed but denied cognitive changes. When he was in high school, he had a blow to the head with delayed onset of confusion a couple of days later. He was diagnosed with concussion.  Seizures: None reported  Stroke: None reported  Movement Concerns: Normal pressure hydrocephalus and parkinsonism  Prior Neuropsychological Testing: None reported; unsure  Referral Diagnosis: R41.3 (ICD-10-CM) - Memory loss     Recent Labs    Records from 04/09/2024 noted "biomarkers from the high-volume LP are not consistent with Alzheimer's disease."    Lab Results   Component Value Date    [id number] 588 07/10/2024     Lab Results   Component Value Date    RPR Non-reactive 07/01/2024     Lab Results   Component Value Date    FOLATE 26.2 (H) 07/01/2024     Lab Results   Component Value Date    TSH 2.540 07/10/2024     Lab Results   Component Value Date    HGBA1C 5.3 07/10/2024     Lab Results   Component Value Date    [id number] Non-reactive 07/01/2024     Imaging    Results for orders placed or performed during the hospital encounter of 07/08/24   MRI Brain Without Contrast    Narrative    EXAMINATION:  MRI BRAIN WITHOUT CONTRAST    CLINICAL HISTORY:  Memory loss;.  Other " amnesia    TECHNIQUE:  Multiplanar multisequence MR imaging of the brain was performed without contrast    COMPARISON:  Outside CT and MR report from 05/16/2023 and 10/15/2021 respectively.  No direct imaging comparison available for review at this time.    FINDINGS:  Brain: Prominent artifact from the patient's right frontal approach programmable shunt catheter with its tip appearing to terminate within the right lateral ventricle.  No acute infarct, hemorrhage, midline shift or mass effect, or space-occupying extra-axial fluid collection allowing for limited visualization of the intracranial structures particularly on the right.  Patchy subcortical and confluent deep periventricular white matter signal changes consistent with moderate chronic small vessel ischemic and involutional changes.  Asymmetric volume loss/gliosis involving the anterior left temporal lobe which is nonspecific but could be correlated for any history of remote trauma.  Small area of gliosis/encephalomalacia within the right frontal lobe associated with the traversing catheter.  Tiny chronic lacunar type infarct within the left cerebellum.  Midline Structures: The midline structures of the brain are normal.  Ventricles: No acute hydrocephalus.  Vasculature: The vascular flow voids at the base of the brain are within normal limits.  Calvarium: The visualized osseous structures are unremarkable.  Sinuses: The paranasal sinuses are adequately aerated.  Orbits: Ocular lens replacements.  Otherwise within normal limits.  Mastoids: The mastoid air cells are adequately aerated.  Extracranial soft tissues: The visualized extracranial soft tissues are unremarkable.      Impression    1. No acute process identified noting this evaluation is somewhat limited secondary to extensive regional artifact from the patient's right frontal approach programmable shunt.  No acute hydrocephalus.  2. Chronic ischemic changes/volume loss as discussed  above.      Electronically signed by: Josr Ibrahim  Date:    07/08/2024  Time:    10:12     MRI brain 2021 (outside):  FINDINGS:   Brain: The brain is normal in morphology and signal intensity for the   patient's age. There is a mild amount of non-specific white matter T2   hyperintensity, likely related to chronic age-related small vessel changes.   No restricted diffusion.   Ventricular system: Normal.   Extra-axial spaces: Normal.   Posterior fossa: Normal.   Flow-voids: Normal.   Calvarium and skull base: No significant abnormalities.   Orbits: The globes are normal in appearance. No abnormality of the optic   nerve sheath or extraocular muscles. The lacrimal gland is unremarkable. The   cavernous sinus regions appear normal.   Paranasal sinuses and extracranial soft tissues: Unremarkable.   Following contrast administration, there is no pathological enhancement.      Trino scan 4/2024:  Normal (see encounters for full report)     Current Outpatient Medications:     aspirin (ECOTRIN) 81 MG EC tablet, Take 1 tablet (81 mg total) by mouth once daily., Disp: , Rfl:     ciclopirox 1 % shampoo, Apply topically every evening., Disp: 120 mL, Rfl: 10    citalopram (CELEXA) 20 MG tablet, Take 1 tablet (20 mg total) by mouth once daily. TAKES 1.5 TABLETS DAILY, Disp: 90 tablet, Rfl: 3    ketoconazole (NIZORAL) 2 % shampoo, Apply topically twice a week., Disp: 120 mL, Rfl: 10    Lactobacillus rhamnosus GG (CULTURELLE) 10 billion cell capsule, 1 capsule every day by oral route., Disp: , Rfl:     memantine (NAMENDA) 10 MG Tab, Take 1 tablet (10 mg total) by mouth 2 (two) times daily., Disp: 180 tablet, Rfl: 3    multivitamin with minerals tablet, Take 1 tablet by mouth once daily., Disp: , Rfl:     im-zv-cr3-dha-epa-fish-lut-kavita (OCUVITE ADULT 50 PLUS) 250 mg (90 mg-160 mg) Cap, , Disp: , Rfl:     simvastatin (ZOCOR) 20 MG tablet, Take 1 tablet (20 mg total) by mouth once daily., Disp: 90 tablet, Rfl: 3    Updated/Relevant  "Psychiatric History:  Records noted a history of depressed mood.    Substance Use:  No current use reported; he quit smoking approximately 20 years ago.    MENTAL STATUS AND OBSERVATIONS:  APPEARANCE: Not assessed  ALERTNESS/ORIENTATION: Attentive and alert. Fully oriented (x5) to time and place  GAIT: Not assessed  MOTOR MOVEMENTS/MANNERISMS: Not assessed  SPEECH/LANGUAGE: Halting in rate, somewhat stunted rhythm, monotone, and intact volume. Moderate word finding difficulty noted and difficulty producing words; he had some word substitutions (e.g., dementia for dyslexia). Word use was consistent with his reported educational history, and he did not have comprehension difficulty during the interview.  STATED MOOD/AFFECT: The patients stated mood was "good." Affect was congruent with stated mood.   INTERPERSONAL BEHAVIOR: Rapport was quickly and easily established   SUICIDALITY/HOMICIDALITY: Denied  HALLUCINATIONS/DELUSIONS: None evidenced or endorsed  THOUGHT PROCESSES/INSIGHT: Thoughts seemed logical and goal-directed.     BILLING  Service Description CPT Code Minutes Units   Psychiatric diagnostic evaluation by physician [id number]  0   Neurobehavioral status exam by physician [id number] [phone]   Each additional hour by physician [id number]  0     "

## 2024-12-05 ENCOUNTER — OFFICE VISIT (OUTPATIENT)
Dept: NEUROLOGY | Facility: CLINIC | Age: 74
End: 2024-12-05
Payer: MEDICARE

## 2024-12-05 DIAGNOSIS — G31.84 MCI (MILD COGNITIVE IMPAIRMENT): ICD-10-CM

## 2024-12-05 DIAGNOSIS — Z87.898 HISTORY OF LEARNING DISABILITY: Primary | ICD-10-CM

## 2024-12-05 DIAGNOSIS — F41.9 ANXIETY: ICD-10-CM

## 2024-12-05 DIAGNOSIS — G91.2 NPH (NORMAL PRESSURE HYDROCEPHALUS): ICD-10-CM

## 2024-12-05 DIAGNOSIS — G20.C PARKINSONISM, UNSPECIFIED PARKINSONISM TYPE: ICD-10-CM

## 2024-12-05 DIAGNOSIS — R41.3 MEMORY LOSS: ICD-10-CM

## 2024-12-05 PROCEDURE — 96132 NRPSYC TST EVAL PHYS/QHP 1ST: CPT | Mod: ,,, | Performed by: PSYCHIATRY & NEUROLOGY

## 2024-12-05 PROCEDURE — 96133 NRPSYC TST EVAL PHYS/QHP EA: CPT | Mod: ,,, | Performed by: PSYCHIATRY & NEUROLOGY

## 2024-12-05 PROCEDURE — 96139 PSYCL/NRPSYC TST TECH EA: CPT | Mod: ,,, | Performed by: PSYCHIATRY & NEUROLOGY

## 2024-12-05 PROCEDURE — 96138 PSYCL/NRPSYC TECH 1ST: CPT | Mod: ,,, | Performed by: PSYCHIATRY & NEUROLOGY

## 2024-12-05 PROCEDURE — 99499 UNLISTED E&M SERVICE: CPT | Mod: S$PBB,,, | Performed by: PSYCHIATRY & NEUROLOGY

## 2024-12-05 PROCEDURE — 99999 PR PBB SHADOW E&M-EST. PATIENT-LVL I: CPT | Mod: PBBFAC,,, | Performed by: PSYCHIATRY & NEUROLOGY

## 2024-12-05 PROCEDURE — 99211 OFF/OP EST MAY X REQ PHY/QHP: CPT | Mod: PBBFAC,PO | Performed by: PSYCHIATRY & NEUROLOGY

## 2024-12-13 PROBLEM — F41.9 ANXIETY: Status: ACTIVE | Noted: 2024-12-13

## 2024-12-13 NOTE — PROGRESS NOTES
NEUROPSYCHOLOGY CONSULT    Referral Information  Name: Donaldo Ferrara  MRN: [id number]  : 1950  Age: 74 y.o.  Race: White  Gender: male  Dominant Hand: Right  Referring Provider: Jeanie Harris, [facility]  [address]  [place] [address]  Billing: See below for details as coding/billing has changed   Referral Reason/Medical Necessity: Neuropsychological evaluation to assess current cognitive functioning, aid in differential diagnosis, and provide treatment recommendations in the context of cognitive concerns.  Consent/Emergency Plan: The patient expressed an understanding of the purpose of the evaluation and consented to all procedures. I informed the patient of limits to confidentiality and discussed an emergency plan.    SUMMARY/TREATMENT PLAN   Results from the interview indicate the following diagnoses and treatment plan recommendations. The patient has the ability to follow a treatment plan without help from family.    Diagnoses/Plan:  1. History of learning disability    2. Memory loss  -     Ambulatory referral/consult to Neuropsychology    3. Mild Cognitive Impairment    4. Normal Pressure Hydrocephalus S/P  Shunt In 2024    5. Parkinsonism, unspecified Parkinsonism type    6. Anxiety    Summary/Conclusions:  Mr. Ferrara is followed by Neurology for history of tremor, periodic limb movement disorder, and REM sleep behavior disorder. He has a diagnosis of Normal Pressure Hydrocephalus, with shunt placement in May 2024 and some improvement in cognition after lumbar puncture. During the interview, he reported longstanding learning difficulties and generally stable cognition over time. He denied major neuropsychiatric concerns and reported occasional depressed mood and anxiety. He is as independent in basic and instrumental activities of daily living as he has been historically.    Neuropsychological assessment results were interpreted in the context of estimated average premorbid abilities and  variable performance validity. Within that context, he demonstrated relatively reduced or reduced attention and working memory when repeating, reversing, and sequencing digits and when solving mental arithmetic problems. Visuomotor processing speed was largely within expectation on tasks with a less significant motor component, with greater difficulty on a complex processing speed task with a motor component. He demonstrated difficulty on executive functioning measures assessing set shifting, verbal abstract reasoning, and phonemic fluency; semantic fluency, nonverbal abstract reasoning, and motor programming were intact. Language performance was notable for poor reading, consistent with report of a learning disability. Naming was reduced but improved with cuing. Visuospatial functioning was largely consistent with premorbid expectations. Fine motor speed and dexterity were intact bilaterally, with stronger performance using his dominant, right hand. Learning and recall of a word list was reduced, but performance improved with use of a recognition format. Learning of verbal information given in context was broadly consistent with premorbid estimates, and he retained a significant amount of information over time, with intact recognition. Learning of visual information was reduced, with reduced retention but 100% retention of learned information. Recognition was intact. He endorsed minimal depression and mild anxiety on self-report measures that were read to him.    In sum, Mr. Ferrara exhibited changes in cognition from his estimated baseline. Taken with report of cognitive changes in daily life but performance of activities of daily living at the level of independence he has displayed historically, a diagnosis of mild neurocognitive disorder appears warranted. Patterns of performance on testing were largely frontal/subcortical in nature, with relatively intact visuospatial functioning. Etiology is likely  multifactorial, with contribution from NPH and vascular risk factors, as well as parkinsonism.     Recommendations:   Neuropsychology Follow-up: Follow-up in 1-2 weeks to review diagnoses and recommendations. Follow-up assessment is recommended in 12-18 months to assess cognitive changes over time.    Medical Follow-Up: Continue usual follow up for current active medical issues.     Driving: Based on these findings it is recommended that Mr. Ferrara undergo a formal, on-the-road driving evaluation. This evaluation can be completed at the [facility] with Dequan Izquierdo. If interested, I can place a referral and the family can contact Georgie Pressley at [phone] for scheduling.     Recommendations for Mr. Ferrara:  Attention: Remember that inattention and lack of focus are major culprits to forgetting information so be sure and practice paying attention for adequate learning of information. If you rely on passive attention to remembering something (e.g., yeah, uh-huh approach), youll find you cannot recall it later. I recommend the following to improve attention, which may aid in later recall:   Reduce distractions in the area as much as possible.  Look at the person as they are speaking to you.   Paraphrase as they are speaking  Write down important pieces of information   Ask them to repeat if you zone out.   Have them simplify and reduce information that you need to attend to during conversation.   Have visual cues to remind you if you need to do something later.  Processing Speed:   Using multiple modalities (e.g., listening, writing notes, asking questions, recording) to learn new information is likely to allow additional time for processing, thus improving memory for the material.   Allowing sufficient time to complete tasks will reduce frustration and help to ensure completion.  Language: Strategies to help with Word Finding. Not being able to find a word when you need it is a common and very  annoying problem. It is not strictly a memory issue, but more a filing and retrieval issue. You know the word or name you want, but it cannot be found in your brain file. It is like having all the files in your file cabinet emptied on the floor. Every piece of information is there but finding it can be a challenge. One strategy that may help is working with a speech pathologist/therapist to learn techniques to decrease word finding problems. Some of those strategies/techniques include the following:  Use circumlocutions. Describe the object you're trying to name instead of naming it.  Recite you're A, B, Cs. Go through the alphabet to see if a letter triggers finding the word.  Picture it. Try to visualize the spelling or writing of the word.  Relax. The harder you try to force yourself to come up with the word, the more frustrated you get and the worse you function.   Write it down. In situations where using correct words is critical, such as communicating on the radio while flying, write down the key words so that they are in front of you if needed.  Use word association. For words or names you continually misfile and can't find, try to come up with a word association, something that reminds you of the word or name.  Write a script. Try scripting something important that you want to say. Either write it out or practice it beforehand, so that you get it right.  Play word games. Doing crossword puzzles and playing word finding games may help your brain become more efficient at filing and retrieving words.  Executive Functioning:  Dont attempt to multi-task.  Separate tasks so that each can be completed one at a time.  Consider using a calendar/day planner, as that may be effective to help you plan and stay on track.  Color-coding specific tasks by importance may add additional benefit to your planner.  Break down large projects into smaller tasks and write down the steps to completing the task.  Taking notes  while reading can help with recall.  Storing Information: Use the below strategies to help you further enhance how information is stored.  Rehearse - Immediately after seeing/hearing something, try to recall it.  Wait a few minutes, then check again.  Gradually lengthen the intervals between rehearsals.  Repetition of learned material is critical to ensure storage of information to be learned. Self-test at home to ensure learning.  Write down important information to improve your attention and focus and to have something to look back on when you need to recall it.  Make sure the person doesnt rattle off, but presents in a clear, logical, and unhurried manner.   Recalling Information:  Jog your memory - Lose something?  Think back to when you last had it.  What did you do next?  And after that?  Mentally walk yourself through each activity that followed.  Prodding your memory this way may enable you to recall the location of the missing item.  Use a cue - Symbolic reminders (the proverbial string around the finger) are helpful.  So too are memos, timers, calendar notes, etc.--keep them in visible, appropriate places.  Get organized - Have fixed locations for all important papers, key phone numbers, medications, keys, wallet, glasses, tools, etc.  Develop routines - Routines can anchor memories so they do not drift away.    Resources: Consider resources for support through the Governors Office of Elderly Affairs (http://goea.[place].gov/), [place] Chapter of the Alzheimers Association (www.alz.org/[place]/), the Family Caregiver [place] (www.caregiver.org), the American Psychological Association (http://www.apa.org/pi/about/publications/caregivers/consumers/index.aspxconsumers/index.aspx), and the [facility] on Aging (www.coastseniors.org).    Practice good cognitive/brain health hygiene:  Get a good nights sleep, as this can enhance alertness and cognition.  Keep your brain active. Find activities  to stay mentally active, such as reading, games (cards, checkers), puzzles (crosswords, Sudoku, jig saw), crafts (models, woodworking), gardening, or participating in activities in the community.  Stay socially engaged. Continue staying active with your family and friends.    Managing Vascular Risk Factors: A personal history of disorders that affect the cardiovascular system (e.g., hypertension, hyperlipidemia, diabetes) can have a negative impact on brain functioning especially over many years. Therefore, it is very important for Mr. Ferrara to maintain good control over risk factors. The following is recommended:  Take all medications as prescribed and follow-up with recommendations above.  Get regular physical exercise to the extent that it is possible.   Follow a Mediterranean diet, which emphasizes plant-based foods, whole grains, fish and healthy fats, such as olive oil, and has been shown to be brain protective.   Check blood pressure, cholesterol levels, blood sugar, and others as appropriate.    Prepare for the future: Mr. Ferrara and caregivers should consider formal arrangements to allow a designated person to make medical and financial decisions for Mr. Ferrara, should he become unable to do so.  Options to consider include designating a healthcare proxy, medical and/or financial power of , and completing advanced directives for healthcare decisions and estate planning (e.g., finalizing a will).  If cost is prohibitive, [facility] Legal Services (https://[facility].org/) provides free  for individuals with low income.     Recommendations: Consider stress management techniques to reduce anxiety and respond to anxiety as it arises. Heres a simple three-minute exercise you can use no matter where you are:  1. Sit or stand up nice and tall. Let your eyes relax. Relax your jaw. Let your shoulders relax down your back and start to focus your attention on your breath.  2. Breathe all the way in  through your nose, filling your belly with your breath. Then, breathe all the way out through your nose. Its that simple.  3. Start to breathe into a count of three. Inhale for one, two, three. Then, exhale into a count of six: six, five, four, three, two and one.  4. Repeat. Inhale: one, two, three. Exhale: six, five, four, three, two and one.  5. Continue just like this for three minutes, or as long as you'd like. You can set a timer on your phone at the beginning of your practice.    Should your mind wander, simply notice, without judgment. Observe your thought. And let it go. Return your breath to your attention as many times as it takes, training your mind in the same way we train our bodies.    https://blog.[facility].org/articles/3-minute-mindful-breathing-exercise-for-anxiety-relief    https://blog.[facility].org/articles/shake-it-off-in-the-new-year-simple-stress-relief-techniques     Thank you for allowing me to participate in Mr. Ferrara's care.  If you have any questions, please contact me at [phone].    Nelli Durán, Ph.D., ABPP  Board Certified in Clinical Neuropsychology  [facility] - Department of Neurology    HISTORY OF PRESENT ILLNESS AND CURRENT SYMPTOMS     Mr. Ferrara completed an initial interview via telehealth on 11/20/2024. The background information is taken from that interview, with updates. Mr. Ferrara has active problems noted below. He initially visited Neurology on 07/01/2024, reporting tremor in his right hand, starting in 2018, and diagnoses of periodic limb movement disorder and REM sleep behavior disorder. He and his wife also reported prior assessment by Neurology and diagnosis of moderate neurocognitive disorder, with prescription for Namenda. Formal assessment of mental status was not completed, but general observations were within normal limits, with the exception of monotone voice. He also has a diagnosis of normal pressure hydrocephalus, with shunt placement in May 2024  "and reported improvement in cognition post-shunting. He completed physical therapy for balance, and did not report significant changes during follow-up in September 2024. Physical exam suggested slightly stooped gait but good arm swing, with difficulty relaxing his arms and legs and very mild tremor of his right hand during left finger taps. Specific motor exam suggested monotone speech with slurred but understandable production, slight rigidity in his right and left lower extremities, and minimal slowness in movement. During follow-up in November 2024, he reported forgetfulness. Performance on a brief mental status measure was largely within normal limits (Mini Mental Status Exam [MMSE]=27/30). This was a relative improvement from screening on a different measure in April 2024, prior to shunt placement ([place] Cognitive Assessment [MoCA]=17/30) and consistent with performance post-lumbar puncture (MoCA=22/30).    Cognitive Symptoms:  Type/Examples:   Attention: He reported slight changes in attention.  Mental Speed: He reported slowing in his thinking and taking longer for words to come out.  Memory: Mr. Ferrara reported "a little bit" of memory difficulty. Reminders help some. He is not forgetting events.  Language: He reported word-finding difficulty that is longstanding. He denied comprehension difficulty for oral information; reading difficulty is longstanding.  Visuospatial/Perceptual: He reported occasionally getting lost in familiar places. He denied difficulty using tools or appliances.  Executive Functioning: He reported he may forget plans. He sometimes misplaces items.  Onset: He reported longstanding learning difficulties, with greater difficulty when computers became more common. Records noted onset of mood and memory changes in 2018.  Course: Stable, per Mr. Ferrara. Records noted improvement with lumbar puncture and reported improvement post-shunting.    Current Functional " "Status/Needs:  ADLs  Self-Care Eating Safety Other   Independent Independent Independent      Instrumental IADLs:   Driving Medications/Health Household Finances   Independent. He said his wife would say he gets lost; he does not think he does. Wife helps, longstanding Independent, no difficulty Wife manages, longstanding     Psychiatric/Behavioral Symptoms:  Mood:  Depression/Dysphoria Anxiety/Fearfulness Irritability   He reported feeling sad, "once in a, when the moon turns blue."  He reported "a little bit" of anxiety when having to talk with people. He denied general anxiety. He reported occasional irritability but denied a change.     Behavior:  Agitation/Resistance Delusions/Paranoia Hallucinations   None reported None reported None reported     Apathy/Motivation Repetitive/Restlessness Other   None reported None reported      Neurovegetative:  Sleep/Nighttime  [employer]   Records noted REM sleep behavior disorder. He has vivid dreams. He reported sleep has been good lately, and he sleeps on average 8 hours per night. No changes noted He tries to stay busy. He does yard work and walks almost every day (1-2 miles). He is doing physical therapy exercises at home.     Suicidal/Homicidal Ideation: He reported some thoughts about "what am I doing here." This is rare. No homicidal ideation reported.    Physical Symptoms: See Neurology notes for detailed physical symptoms. He reported balance is better with shunt placement and physical therapy; incontinence also improved. He reported his vision is good; he just had cataract surgery. He wears hearing aids. He denied problems with blood pressure management, dizziness, or fainting.      PERTINENT BACKGROUND INFORMATION   SOCIAL HISTORY    Family Status:    Current Living Situation: lives with wife and dog; moved to [place] in February or March 2024  Primary Source of Support: wife  Daily Activities: plays puzzles on his iPad; yardwork  Stressors: none " "reported  Other Factors:  Educational Level: He reported having "dementia all my life," stating he sees things backwards. He agreed when asked if he meant to say "dyslexia." He repeated 2nd, 3rd, and 4th grades. He received extra help in some of his classes and was in smaller classes in elementary school. He had remedial math and reading. He was not diagnosed but described symptoms consistent with learning disabilities in reading and writing, possibly math. He graduated from high school at age 19.  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 He was a member of the decommissioning crew and traveled to the [employer]. He served for 4 years and had an honorable discharge.  Other: He was unaware of any developmental delays; he had measles as a child.    No family history on file.  Family Neurologic History: He believes his brother is showing signs of dementia (late 70s)  Family Psychiatric History: Negative for heritable risk factors    MEDICAL STATUS  Patient Active Problem List   Diagnosis    Hypercholesterolemia    Mild Cognitive Impairment    Right Hand Tremor    Normal Pressure Hydrocephalus S/P  Shunt In 05/2024    Restless Legs Syndrome    Impaired Balance    Diverticulosis    H/O Colon Polyp On 2019 Colonoscopy    Dysthymia    Overweight (BMI 25.0-29.9)    Benign Prostatic Hyperplasia    Eczema    Parkinsonism, unspecified Parkinsonism type    Memory loss    PLMD (periodic limb movement disorder)    Mild Bilateral Carotid Artery Stenosis    History of learning disability    Anxiety     Past Medical History:   Diagnosis Date    a Family H/O Early CAD     On ASA 81 Mg Daily; His Sister And Brother    a Mild Bilateral Carotid Artery Stenosis     Will Repeat A Carotid U/S In 3 Years; 6/26/24 " "Bilateral Carotid AA U/S = Mild BICA Disease (See Report)    c Hypercholesterolemia     On Zocor 20 Mg qHS    d Family H/O Diabetes Mellitus     His Sister And Brother    f Overweight     j Diverticulosis     j H/O Colon Polyp On 2019 Colonoscopy     k Benign Prostatic Hyperplasia     m Impaired balance     6/10/24 Referred To [facility]    m Mild Cognitive Impairment     6/10/24 [facility]erred[facility]; On Namenda For This    m Normal Pressure Hydrocephalus S/P  Shunt In 05/2024     Dr. Niranjan Martinez On 5/20/24 With A "MedTronic StrataMR II Adjustable Valve" At [facility] [place]    m Restless Legs Syndrome     m Right Hand Tremor     6/10/24 [facility]erredTo [facility]; Better After His  Shunt    n Dysthymia     On Celexa 20 Mg Daily    q Eczema     Wellness examination 12/12/2024      Past Surgical History:   Procedure Laterality Date    INSERTION OF SHUNT Right      Updated/Relevant Neurologic History:  Falls: He fell out of bed when sleeping in March 2024  TBI: He hit his face when he fell out of bed but denied cognitive changes. When he was in high school, he had a blow to the head with delayed onset of confusion a couple of days later. He was diagnosed with concussion.  Seizures: None reported  Stroke: None reported  Movement Concerns: Normal pressure hydrocephalus and parkinsonism  Prior Neuropsychological Testing: None reported; unsure  Referral Diagnosis: R41.3 (ICD-10-CM) - Memory loss     Recent Labs    Records from 04/09/2024 noted "biomarkers from the high-volume LP are not consistent with Alzheimer's disease."    Lab Results   Component Value Date    [id number] 588 07/10/2024     Lab Results   Component Value Date    RPR Non-reactive 07/01/2024     Lab Results   Component Value Date    FOLATE 26.2 (H) 07/01/2024     Lab Results   Component Value Date    TSH 2.540 07/10/2024     Lab Results   Component Value Date    HGBA1C 5.3 07/10/2024     Lab Results "   Component Value Date    [id number] Non-reactive 07/01/2024     Imaging    Results for orders placed or performed during the hospital encounter of 07/08/24   MRI Brain Without Contrast    Narrative    EXAMINATION:  MRI BRAIN WITHOUT CONTRAST    CLINICAL HISTORY:  Memory loss;.  Other amnesia    TECHNIQUE:  Multiplanar multisequence MR imaging of the brain was performed without contrast    COMPARISON:  Outside CT and MR report from 05/16/2023 and 10/15/2021 respectively.  No direct imaging comparison available for review at this time.    FINDINGS:  Brain: Prominent artifact from the patient's right frontal approach programmable shunt catheter with its tip appearing to terminate within the right lateral ventricle.  No acute infarct, hemorrhage, midline shift or mass effect, or space-occupying extra-axial fluid collection allowing for limited visualization of the intracranial structures particularly on the right.  Patchy subcortical and confluent deep periventricular white matter signal changes consistent with moderate chronic small vessel ischemic and involutional changes.  Asymmetric volume loss/gliosis involving the anterior left temporal lobe which is nonspecific but could be correlated for any history of remote trauma.  Small area of gliosis/encephalomalacia within the right frontal lobe associated with the traversing catheter.  Tiny chronic lacunar type infarct within the left cerebellum.  Midline Structures: The midline structures of the brain are normal.  Ventricles: No acute hydrocephalus.  Vasculature: The vascular flow voids at the base of the brain are within normal limits.  Calvarium: The visualized osseous structures are unremarkable.  Sinuses: The paranasal sinuses are adequately aerated.  Orbits: Ocular lens replacements.  Otherwise within normal limits.  Mastoids: The mastoid air cells are adequately aerated.  Extracranial soft tissues: The visualized extracranial soft tissues are unremarkable.       Impression    1. No acute process identified noting this evaluation is somewhat limited secondary to extensive regional artifact from the patient's right frontal approach programmable shunt.  No acute hydrocephalus.  2. Chronic ischemic changes/volume loss as discussed above.      Electronically signed by: Josr Ibrahim  Date:    07/08/2024  Time:    10:12     MRI brain 2021 (outside):  FINDINGS:   Brain: The brain is normal in morphology and signal intensity for the   patient's age. There is a mild amount of non-specific white matter T2   hyperintensity, likely related to chronic age-related small vessel changes.   No restricted diffusion.   Ventricular system: Normal.   Extra-axial spaces: Normal.   Posterior fossa: Normal.   Flow-voids: Normal.   Calvarium and skull base: No significant abnormalities.   Orbits: The globes are normal in appearance. No abnormality of the optic   nerve sheath or extraocular muscles. The lacrimal gland is unremarkable. The   cavernous sinus regions appear normal.   Paranasal sinuses and extracranial soft tissues: Unremarkable.   Following contrast administration, there is no pathological enhancement.      Trino scan 4/2024:  Normal (see encounters for full report)     Current Outpatient Medications:     aspirin (ECOTRIN) 81 MG EC tablet, Take 1 tablet (81 mg total) by mouth once daily., Disp: , Rfl:     ciclopirox 1 % shampoo, Apply topically every evening., Disp: 120 mL, Rfl: 10    citalopram (CELEXA) 20 MG tablet, Take 1 tablet (20 mg total) by mouth once daily. TAKES 1.5 TABLETS DAILY, Disp: 90 tablet, Rfl: 3    ketoconazole (NIZORAL) 2 % shampoo, Apply topically twice a week., Disp: 120 mL, Rfl: 10    Lactobacillus rhamnosus GG (CULTURELLE) 10 billion cell capsule, 1 capsule every day by oral route., Disp: , Rfl:     memantine (NAMENDA) 10 MG Tab, Take 1 tablet (10 mg total) by mouth 2 (two) times daily., Disp: 180 tablet, Rfl: 3    multivitamin with minerals tablet, Take 1 tablet by  "mouth once daily., Disp: , Rfl:     hp-ca-zf5-dha-epa-fish-lut-kavita (OCUVITE ADULT 50 PLUS) 250 mg (90 mg-160 mg) Cap, , Disp: , Rfl:     simvastatin (ZOCOR) 20 MG tablet, Take 1 tablet (20 mg total) by mouth once daily., Disp: 90 tablet, Rfl: 3    Updated/Relevant Psychiatric History:  Records noted a history of depressed mood.    Substance Use:  No current use reported; he quit smoking approximately 20 years ago.    MENTAL STATUS AND OBSERVATIONS:  APPEARANCE: Casually and appropriately dressed and groomed. He said he uses reading glasses but did not bring them and declined to use an office pair.  ALERTNESS/ORIENTATION: Attentive and alert. Fully oriented to place; not oriented to date or season  GAIT: Not assessed  MOTOR MOVEMENTS/MANNERISMS: Not assessed  SPEECH/LANGUAGE: Halting in rate, somewhat stunted rhythm, monotone, and intact volume. Moderate word finding difficulty noted and difficulty producing words; he had some word substitutions (e.g., dementia for dyslexia). Word use was consistent with his reported educational history, and he did not have comprehension difficulty during the interview.  STATED MOOD/AFFECT: The patients stated mood was "good." Affect was congruent with stated mood.   INTERPERSONAL BEHAVIOR: Rapport was quickly and easily established   SUICIDALITY/HOMICIDALITY: Denied  HALLUCINATIONS/DELUSIONS: None evidenced or endorsed  THOUGHT PROCESSES/INSIGHT: Thoughts seemed logical and goal-directed.     TEST TAKING BEHAVIOR and VALIDITY: Mr. Ferrara was cooperative with test procedures, with occasional argument when redirected to complete tasks in the standard manner. He had mild impatience with instructions. Scores on stand-alone and embedded performance validity measures were variable, with one stand-alone and one embedded measure outside of expectation. The current results, therefore, may underestimate the patient's current functioning and can be considered to be a minimal " estimate.    PROCEDURES/TESTS ADMINISTERED:  In addition to performing a review of pertinent medical records, reviewing limits to confidentiality, conducting a clinical interview, and explaining procedures, the following measures were administered by Jenny Ding, assistant to a psychologist, under the supervision of Nelli Durán, PhD, ABPP: MSVT; Test of Premorbid Functioning; CITH; DCT; Wechsler Adult Intelligence Scale, Fourth Edition (WAIS-IV) selected subtests; Wechsler Memory Scale, Fourth Edition (WMS-IV), selected subtests; Neuropsychological Assessment Battery (NAB), selected subtests; Verbal fluency tests (FAS & animal naming; Porfirio et al., 2004 norms); Haley Verbal Learning Test, Revised (HVLT-R; Form 1); Symbol Digit Modalities Test (SDMT); Judgment of Line Orientation (normative data for age 74); Trail Making Test, parts A and B (Porfirio et al., 2004 norms); Frontal Assessment Battery (RICKY); Brief Visual Memory Test, Revised (BVMT-R); Grooved Pegboard Test (Porfirio et al., 2004 norms); Dennis Complex Figure Test (RCFT, copy); Geriatric Depression Scale (GDS-30); Generalized Anxiety Disorder Questionnaire (RODRIGO-7). Manual norms were used unless otherwise indicated.      TEST RESULTS    PREMORBID FUNCTIONING Raw Score Type of Standardized Score Standardized Score Percentile/CP Descriptor   TOPF simple dem. eFSIQ -  53 Average   INTELLECTUAL FUNCTIONING Raw Score Type of Standardized Score Standardized Score Percentile/CP Descriptor   WAIS-IV         WMI - SS 69 2 Below Average   COGNITIVE SCREENING Raw Score Type of Standardized Score Standardized Score Percentile/CP Descriptor   Orientation Questions 8/10        Orientation - Place 5/5 - - - -   Orientation - Date 3/5 - - - -   LANGUAGE FUNCTIONING Raw Score Type of Standardized Score Standardized Score Percentile/CP Descriptor   TOPF Word Reading 5 SS 65 1 Exceptionally Low    NAB Naming 26 Tscore 36 8 Below Average   NAB Naming Percent  Correct After Semantic Cuing 20 - - 44 WNL   NAB Naming Percent Correct After Phonemic Cuing 75 - - 56 WNL   COWAT 13 Tscore 27 1 Exceptionally Low    Animal Naming 12 Tscore 39 14 Low Average   VISUOSPATIAL FUNCTIONING Raw Score Type of Standardized Score Standardized Score Percentile/CP Descriptor   Chin PECK 28 - - 72 Average   RCFT Copy 28 - - 11-16 Low Average   RCFT Time to Copy 144 - - >16 WNL   BVMT-R Copy 10 - - - -   LEARNING & MEMORY Raw Score Type of Standardized Score Standardized Score Percentile/CP Descriptor   HVLT-R         Total Immediate 13 Tscore 31 3 Below Average   Delayed Recall 0 Tscore <20 0.1 Exceptionally Low    Retention % 0 Tscore <20 0.1 Exceptionally Low    Hits 12 - - - -   False Positives 2 - - - -   Discrimination  10 Tscore 48 42 Average   MSVT         MSVT  - - - -   MSVT DR 85 - - - -   MSVT Cons 85 - - - -   MSVT PA 70 - - - -   MSVT FR 35 - - - -   CITH 10 - - - -   WMS-IV Subtests         LM I 20 ss 7 16 Low Average   LM II 13 ss 9 37 Average   LM Recognition 19 - - 51-75 Average   BVMT-R         IR 6 Tscore 26 1 Exceptionally Low    DR 4 Tscore 35 7 Below Average   Discrimination Index 5 - - >16 WNL   ATTENTION/WORKING MEMORY Raw Score Type of Standardized Score Standardized Score Percentile/CP Descriptor   WAIS-IV Digit Span 14 ss 4 2 Below Average         DS Forward 6 ss 6 9 Low Average         DS Backward 5 ss 6 9 Low Average         DS Sequence 3 ss 5 5 Below Average         Longest Digit Forward 4 - - - -         Longest Digit Backward 4 - - - -         Longest Digit Sequence 3 - - - -   RDS 6 - - - -   WAIS-IV Arithmetic 7 ss 5 5 Below Average   MENTAL PROCESSING SPEED Raw Score Type of Standardized Score Standardized Score Percentile/CP Descriptor   WAIS-IV Symbol Search 20 ss 10 50 Average   SMDT Written 21 zscore -1.4 9 Below Average   SMDT Oral 30 zscore -1.1 14 Low Average   TMT A  45 Tscore 43 24 Low Average   TMT A Total Err. 0 - - - -   DCT 15 - - - -    EXECUTIVE FUNCTIONING Raw Score Type of Standardized Score Standardized Score Percentile/CP Descriptor   TMT B 215 Tscore 35 7 Below Average   TMT B Total Err. 2 - - - -   TMT B Seq Err. 2 - - - -   RICKY 17 zscore 0.5 68 Average   WAIS-IV Similarities 12 ss 5 5 Below Average   WAIS-IV Matrix Reasoning 9 ss 9 37 Average   FRONTOMOTOR  Raw Score Type of Standardized Score Standardized Score Percentile/CP Descriptor   GPT  Tscore 48 42 Average   GPT  Tscore 38 12 Low Average   MOOD & PERSONALITY Raw Score Type of Standardized Score Standardized Score Percentile/CP Descriptor   GDS-30 3 - - - WNL   RODRIGO-7 6 - - - Mild   ss = scaled score (mean = 10, SD = 3); SS = standard score (mean = 100, SD = 15); Tscore mean = 50, SD = 10; zscore (mean = 0.00, SD = 1)     BILLING  Service Description CPT Code Minutes Units   Test Evaluation Services --  --   Neuropsychological testing evaluation services by physician [id number] [phone]   Each additional hour by physician [id number]  2   Test Administration and Scoring --  --   Psychological or neuropsychological test administration and scoring by physician [id number]  0   Each additional 30 minutes by physician [id number]  0   Psychological or neuropsychological test administration and scoring by technician [id number] [phone]   Each additional 30 minutes by technician [id number]  5

## 2024-12-18 ENCOUNTER — OFFICE VISIT (OUTPATIENT)
Dept: NEUROLOGY | Facility: CLINIC | Age: 74
End: 2024-12-18
Payer: MEDICARE

## 2024-12-18 DIAGNOSIS — G31.84 MCI (MILD COGNITIVE IMPAIRMENT): ICD-10-CM

## 2024-12-18 DIAGNOSIS — G20.C PARKINSONISM, UNSPECIFIED PARKINSONISM TYPE: ICD-10-CM

## 2024-12-18 DIAGNOSIS — G91.2 NPH (NORMAL PRESSURE HYDROCEPHALUS): Primary | ICD-10-CM

## 2024-12-18 DIAGNOSIS — Z87.898 HISTORY OF LEARNING DISABILITY: ICD-10-CM

## 2024-12-18 PROCEDURE — 99499 UNLISTED E&M SERVICE: CPT | Mod: S$PBB,,, | Performed by: PSYCHIATRY & NEUROLOGY

## 2024-12-18 NOTE — PROGRESS NOTES
NEUROPSYCHOLOGY FEEDBACK    Referral Information  Name: Donaldo Ferrara  MRN: [id number]  : 1950  Age: 74 y.o.  Race: White  Gender: male  Referring Provider: Jeanie Harris, Fnp [address] [place] [address]  The chief complaint leading to consultation/medical necessity is: Feedback from neuropsychological evaluation.  [place]illin minutes; [id number] attached to testing visit on 2024  Consent/Emergency Plan: The patient expressed an understanding of the purpose of the evaluation and consented to all procedures. I informed the patient of limits to confidentiality and discussed an emergency plan. His wife was present during the visit.    NOTE   Donaldo Ferrara attended a feedback session today.  We discussed the results of the neuropsychological evaluation.  I provided Mr. Ferrara with a copy of the evaluation report and gave time to discuss questions and concerns.    Nelli Durán, Ph.D., ABPP  Board Certified in Clinical Neuropsychology  [facility] - Department of Neurology

## 2025-05-13 ENCOUNTER — OFFICE VISIT (OUTPATIENT)
Dept: NEUROLOGY | Facility: CLINIC | Age: 75
End: 2025-05-13
Payer: MEDICARE

## 2025-05-13 VITALS
BODY MASS INDEX: 29.58 KG/M2 | SYSTOLIC BLOOD PRESSURE: 119 MMHG | HEART RATE: 72 BPM | WEIGHT: 194.56 LBS | DIASTOLIC BLOOD PRESSURE: 69 MMHG

## 2025-05-13 DIAGNOSIS — G47.61 PLMD (PERIODIC LIMB MOVEMENT DISORDER): ICD-10-CM

## 2025-05-13 DIAGNOSIS — G91.2 NPH (NORMAL PRESSURE HYDROCEPHALUS): ICD-10-CM

## 2025-05-13 DIAGNOSIS — G47.30 SLEEP APNEA, UNSPECIFIED TYPE: ICD-10-CM

## 2025-05-13 DIAGNOSIS — G31.84 MCI (MILD COGNITIVE IMPAIRMENT): Primary | ICD-10-CM

## 2025-05-13 PROBLEM — R41.3 MEMORY LOSS: Status: RESOLVED | Noted: 2024-07-08 | Resolved: 2025-05-13

## 2025-05-13 PROCEDURE — 99499 UNLISTED E&M SERVICE: CPT | Mod: S$PBB,,, | Performed by: NURSE PRACTITIONER

## 2025-05-13 PROCEDURE — 99213 OFFICE O/P EST LOW 20 MIN: CPT | Mod: PBBFAC,PO | Performed by: NURSE PRACTITIONER

## 2025-05-13 PROCEDURE — 99483 ASSMT & CARE PLN PT COG IMP: CPT | Mod: S$PBB,,, | Performed by: NURSE PRACTITIONER

## 2025-05-13 PROCEDURE — 99999 PR PBB SHADOW E&M-EST. PATIENT-LVL III: CPT | Mod: PBBFAC,,, | Performed by: NURSE PRACTITIONER

## 2025-05-13 NOTE — PROGRESS NOTES
Caregiver name: Veronica  Relationship to the patient: Spouse   Does the patient have a living will? Yes  Does the patient have a designated healthcare POA? No  Does the patient have a designated general POA? No    Have educational materials/resources been provided? Yes      Activities of Daily Living    Bathing: Independent  Dressing: Independent  Grooming: Independent  Mouth Care: Independent  Toileting: Independent  Transferring Bed/Chair: Independent  Walking: Independent  Climbing: Independent  Eating: Independent      Instrumental Activities of Daily Living    Shopping: Independent  Cooking: Independent  Managing Medications: Needs Help  Using the phone and looking up numbers: Needs Help  Doing Housework: Independent  Doing Laundry: Independent  Driving or using public transportation: Independent  Managing finances: Dependent    Functional Assessment Staging  2   Complains of forgetting location of objects. Subjective work difficulties.             5/13/2025     9:22 AM 12/12/2024    11:03 AM 7/8/2024     1:50 PM 6/10/2024     3:38 PM   Depression Patient Health Questionnaire   Over the last two weeks how often have you been bothered by little interest or pleasure in doing things Not at all Not at all Not at all Not at all   Over the last two weeks how often have you been bothered by feeling down, depressed or hopeless Not at all Not at all Not at all Not at all   PHQ-2 Total Score 0 0 0 0

## 2025-05-13 NOTE — ASSESSMENT & PLAN NOTE
Reported to have cognitive impairment by previous neurologist. Patient reports increased forgetfulness and occasional word finding difficulty  Little improvement in cognition following shunt placement  No reports of behavorial changes, hallucinations. He does have a history of depression and takes Celexa but unsure if this offers any aid now. He also has been diagnosed with PLMS as well as RBD.   MMSE today 28/30; stable   - a Dx of MCI warranted as per recent NP testing; etiology multifactorial (NPH, PD-ism and vascular r/f)  MRI brain scan noted with chronic lacune and mod microvascular ischemic changes; no acute hydrocephalus   Serologies noted  Repeat NP testing in 12-18 mos  Pt was tried on Aricept in the past but was intolerable to it (vivid dreams)  Now maintained on Namenda   - continue   Obtain PSG due to reports of worsening RBD and excessive daytime sleepiness  Safety concerns addressed.   OT driving eval recommended as per NP testing   - new referral placed

## 2025-05-13 NOTE — PROGRESS NOTES
NEUROLOGY  Outpatient Follow Up    [facility]  [address] [facility], [place] [address]  [phone] (office) / [phone] (fax)    Patient Name:  Donaldo Ferrara  :  1950  MR #:  [id number]  Acct #:  [id number]    Date of Neurology Visit: 2025  Name of Provider: JUAN CARLOS Tolbert    Other Physicians:  John Purdy MD (Primary Care Physician); No ref. provider found (Referring)      Chief Complaint: Memory Loss      History of Present Illness (HPI):  Donaldo Ferrara is a right handed  74 y.o. male with a PMHX of HLD, MCI, NPH s/p shunt in May, ,      Patient presents today for tremor. It is to his right hand and started ~ . The tremor occurs at rest and with action. It has occurred when he uses his hand for instance, when driving or performing an action. There is no family history of tremor. He does not drink alcohol or caffeine. He hasn't noticed if his writing is affected by the tremor.    Previous records reviewed and it was originally believed that his tremor began after the initiation of Celexa.   He was also diagnosed with PLMS  and started on CD/LD 25/100 at night only in . This reportedly did offer a lot of aid to his sleep. At one point CD/LD was increased to 50/200 at bedtime. This also reportedly aided in his RUE tremor. At one point in  he was also prescribed Klonopin for RLS. He is no longer taking this med as it was causing nightmares.      Both patient and spouse didn't notice a change in the tremor while on CD/LD despite what previous Neuro records report. Previous neuro notes did report that he was also taking Aricept at one point which enhanced vivid dreams.      Of note, he is s/p shunt in May by Dr. Martinez. Spouse states he was having urinary incontinence and shuffling gait. Since the shunt he has had improvement in these symptoms.      He also reportedly had cognitive testing by previous neurologist and was told he had moderate  neurocognitive disorder. He was placed on Namenda in the last year by his this neurologist. He does fall asleep easily. He thinks he sleeps well at night. He did have a sleep study in the past and it was reportedly normal. He does endorse vivid dreams. The RBD has improved since coming off the CD/LD but he continues to have it on occasion.  He fell out of his bed in March due to RBD.   Once in a while he may think he hears a noise but denies visual hallucinations.  He has been in therapy for the last 2-3 weeks for balance and gait training.          Interval Hx 9/4/2024:  Patient presents today for follow up. Overall patient has been feeling well. Tremor has not changed and is stable since last evaluated. Tremor is mainly noticed when emotional. It continues not to impede on ADLs. He continues to take Celexa which is offering aid in his mood.   He just finished PT for balance. He is now doing home exercises.   There are no reported hallucinations or RBD. He fell out of his bed in March due to RBD but no events since. Spouse states this has improved significantly.        Interval Hx 11/12/2024:  Patient presents today for memory evaluatin. He is solo today. He reports forgetfulness. This comes and goes. He can't remember peoples names. He lives at home with spouse who has mentioned to him about poor memory.     Patient's highest level of education completed was 12 th grade and then trade school. 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 He suspects memory issues became more noticeable about 20 years ago. He struggles more with short term memory loss. He denies personality changes. He admits to intermittent depression.  He takes Celexa for mood. He denies hallucinations. He reports to having vivid dreams. This has been ongoing for the ~ 10-15 years. He also has been diagnosed with PLMS as well as RBD. He does get rest at night. He admits to daytime sleeping. His spouse is managing his medications and the  finances. He denies issues with hygiene and is independent with ADLs. He does have word finding difficulty. He has urinary urgency. He denies recent falls. He is still driving and denies recent MVAs or getting lost in familiar areas. He tries to keep busy with house chores and tries to exercise.   As per EMR he was tried on Aricept in the past but this reportedly made his vivid dreams worse. He is now taking Namenda. He believes his brother is showing signs of dementia. He denies a history of alcoholism and drug use.     Interval Hx 5/13/2025:  Patient presents today for memory follow up. He is accompanied by his spouse who also supplies information. Patient states he has been feeling well overall. Spouse states the patient is more impatient. He agrees to this. He denies significant depression. He has been taking Celexa for several years but is not sure if it offers any aid. He denies hallucinations. He continues to have vivid dreams but these are not bothersome. He also has been diagnosed with PLMS as well as RBD that is not new. He sleeps in a split meng bed with his spouse. He does frequently fall asleep throughout the day. His spouse is managing his medications and the finances. He denies issues with hygiene and is independent with ADLs. He does have word finding difficulty. He has urinary urgency. He denies recent falls.  He is still driving. It was recommended that he have a driving evaluation but this around the time his spouse was ill. He continues to take Namenda.   He will be starting PT in a week for gait and balance.         Past Medical, Surgical, Family & Social History:   Reviewed and updated.    Home Medications:     Current Outpatient Medications:     aspirin (ECOTRIN) 81 MG EC tablet, Take 1 tablet (81 mg total) by mouth once daily., Disp: , Rfl:     ciclopirox 1 % shampoo, Apply topically every evening., Disp: 120 mL, Rfl: 10    citalopram (CELEXA) 20 MG tablet, TAKES 1.5 TABLETS DAILY, Disp: 135  tablet, Rfl: 2    ketoconazole (NIZORAL) 2 % shampoo, SHAMPOO TWICE WEEKLY TO SCALP, Disp: 120 mL, Rfl: 3    Lactobacillus rhamnosus GG (CULTURELLE) 10 billion cell capsule, 1 capsule every day by oral route., Disp: , Rfl:     memantine (NAMENDA) 10 MG Tab, Take 1 tablet (10 mg total) by mouth 2 (two) times daily., Disp: 180 tablet, Rfl: 3    qk-xl-hk0-dha-epa-fish-lut-kavita (OCUVITE ADULT 50 PLUS) 250 mg (90 mg-160 mg) Cap, , Disp: , Rfl:     simvastatin (ZOCOR) 20 MG tablet, Take 1 tablet (20 mg total) by mouth once daily., Disp: 90 tablet, Rfl: 3    Physical Examination:  /69 (BP Location: Right arm, Patient Position: Sitting)   Pulse 72   Wt 88.3 kg (194 lb 8.9 oz)   BMI 29.58 kg/m²                   GENERAL:  General appearance: Well, non-toxic appearing.  No apparent distress.  Neck: supple.     MENTAL STATUS:  Alertness, attention span & concentration: normal.  Language: normal.  Orientation to self, place & time:  normal.  Memory, recent & remote: normal.  Fund of knowledge: normal.  MMSE: 28/30  27/30 (11/2024)     SPEECH:  Clear and fluent.   - monotone?  Follows complex commands.        CRANIAL NERVES:  Cranial Nerves II-XII were examined.  II - Visual fields: normal.  III, IV, VI: PERRL, EOMI, No ptosis, No nystagmus.  V - Facial sensation: normal.  VII - Face symmetry & mobility: normal.  VIII - Hearing: normal  IX, X - Palate: mobile & midline.  XI - Shoulder shrug: normal.  XII - Tongue protrusion: normal.           GROSS MOTOR:  Gait & station: able to rise from chair with arms crossed over chest; slightly stooped, marginal arm swing to right hand and good step height  Tone: no cogwheel to arms but not able to fully relax; unable to relax legs  Arms to Core: no tremor   Arms extended: no tremor  Abnormal movements: none.  Finger-nose: normal.  Rapid alternating movements: no decrementing finger or foot taps; very mild tremor to right hand during left finger taps.   Pronator drift: normal         MUSCLE STRENGTH:   RIGHT      LEFT   5 Neck Ext. 5   5 Neck Flex 5   5 Deltoids 5   5 Biceps 5   5 Triceps 5   5 Forearm.Pr. 5           5 Iliopsoas flex    5   5 Hip Abduct 5   5 Hip Adduct 5   5 Quads 5   5 Hams 5   5 Dorsiflex 5   5 Plantar Flex 5      REFLEXES:     RIGHT Reflex    LEFT   2+ Biceps 2+   2+ Brachiorad. 2+           2+ Patellar 2+      SENSORY:  Light touch: Normal throughout.              III.  MOTOR EXAMINATION         Speech  2 - Monotone, slurred but understandable; moderately impaired.   Facial Expression  0 - Normal.   Tremor at Rest:      Face, lips, chin 0 - Absent.    Hands:      right 0 - Absent.    left 0 - Absent.    Feet:      right 0 - Absent.    left 0 - Absent.    Action or Postural Tremor of Hands      right 0 - Absent.    left 0 - Absent.    Rigidity      Neck 0 - Absent.   Upper Extremity: Right 0 - Absent.   Upper Extremity: Left 0 - Absent.   Lower Extremity: Right 1 - Slight or detectable only when activated by mirror or other movements.   Lower Extremity: Left 1 - Slight or detectable only when activated by mirror or other movements.   Finger Taps      right 0 - Normal.   left 0 - Normal.   Hand Movements      right 0 - Normal.   left 0 - Normal.   Rapid Alternating Movements of Hands      right 0 - Normal.   left 0 - Normal.   Leg Agility      right 0 - Normal.   left 0 - Normal.   Arising from Chair  0 - Normal.   Posture  0 - Normal erect.   Gait  0 - Normal.   Postural Stability (Response to sudden, strong posterior displacement produced by pull on shoulders while patient erect with eyes open and feet slightly apart. Patient is prepared, and can have had some practice runs.)  0 - Normal.   Body Bradykinesia and Hypokinesia (Combining slowness, hesitancy, decreased armswing, small amplitude, and poverty of movement in general)  1 - Minimal slowness, giving movement a deliberate character; could be normal for some persons. Possibly reduced amplitude.                Diagnostic Data Reviewed:   I have personally reviewed provider notes, labs and imaging made available to me today.     Imaging:  MRI brain 7/2024:  FINDINGS:  Brain: Prominent artifact from the patient's right frontal approach programmable shunt catheter with its tip appearing to terminate within the right lateral ventricle.  No acute infarct, hemorrhage, midline shift or mass effect, or space-occupying extra-axial fluid collection allowing for limited visualization of the intracranial structures particularly on the right.  Patchy subcortical and confluent deep periventricular white matter signal changes consistent with moderate chronic small vessel ischemic and involutional changes.  Asymmetric volume loss/gliosis involving the anterior left temporal lobe which is nonspecific but could be correlated for any history of remote trauma.  Small area of gliosis/encephalomalacia within the right frontal lobe associated with the traversing catheter.  Tiny chronic lacunar type infarct within the left cerebellum.  Midline Structures: The midline structures of the brain are normal.  Ventricles: No acute hydrocephalus.  Vasculature: The vascular flow voids at the base of the brain are within normal limits.  Calvarium: The visualized osseous structures are unremarkable.  Sinuses: The paranasal sinuses are adequately aerated.  Orbits: Ocular lens replacements.  Otherwise within normal limits.  Mastoids: The mastoid air cells are adequately aerated.  Extracranial soft tissues: The visualized extracranial soft tissues are unremarkable.     Impression:     1. No acute process identified noting this evaluation is somewhat limited secondary to extensive regional artifact from the patient's right frontal approach programmable shunt.  No acute hydrocephalus.  2. Chronic ischemic changes/volume loss as discussed above.       MRI brain 2021 (outside):  FINDINGS:   Brain: The brain is normal in morphology and signal intensity for  "the   patient's age. There is a mild amount of non-specific white matter T2   hyperintensity, likely related to chronic age-related small vessel changes.   No restricted diffusion.   Ventricular system: Normal.   Extra-axial spaces: Normal.   Posterior fossa: Normal.   Flow-voids: Normal.   Calvarium and skull base: No significant abnormalities.   Orbits: The globes are normal in appearance. No abnormality of the optic   nerve sheath or extraocular muscles. The lacrimal gland is unremarkable. The   cavernous sinus regions appear normal.   Paranasal sinuses and extracranial soft tissues: Unremarkable.   Following contrast administration, there is no pathological enhancement.      Trino scan 4/2024:  Normal (see encounters for full report)     Cardiac:  CUS 6/2024:  Impression:     No evidence of a hemodynamically significant carotid stenosis.    Labs:  Lab Results   Component Value Date    WBC 6.74 07/10/2024    HGB 16.3 07/10/2024    HCT 48.5 07/10/2024     07/10/2024    MCV 92 07/10/2024    RDW 11.9 07/10/2024     Lab Results   Component Value Date     07/10/2024    K 4.5 07/10/2024     07/10/2024    CO2 28 07/10/2024    BUN 15 07/10/2024    CREATININE 1.09 07/10/2024     07/10/2024    CALCIUM 9.1 07/10/2024     Lab Results   Component Value Date    PROT 6.6 07/10/2024    ALBUMIN 4.0 07/10/2024    BILITOT 0.9 07/10/2024    AST 32 07/10/2024    ALKPHOS 96 07/10/2024    ALT 20 07/10/2024     No results found for: "INR", "PROTIME", "PTT"  Lab Results   Component Value Date    CHOL 123 07/10/2024    HDL 43 07/10/2024    LDLCALC 51.4 (L) 07/10/2024    TRIG 143 07/10/2024    CHOLHDL 35.0 07/10/2024     Lab Results   Component Value Date    HGBA1C 5.3 07/10/2024      Lab Results   Component Value Date    [id number] 588 07/10/2024     Lab Results   Component Value Date    FOLATE 26.2 (H) 07/01/2024     Lab Results   Component Value Date    TSH 2.540 07/10/2024     Lab Results   Component Value Date " "   RPR Non-reactive 07/01/2024     No results found for: "VITAMINB1"  No components found for: "HIV 1/2 AG/AB"        NP testing 12/2024  "Mr. Ferrara is followed by Neurology for history of tremor, periodic limb movement disorder, and REM sleep behavior disorder. He has a diagnosis of Normal Pressure Hydrocephalus, with shunt placement in May 2024 and some improvement in cognition after lumbar puncture. During the interview, he reported longstanding learning difficulties and generally stable cognition over time. He denied major neuropsychiatric concerns and reported occasional depressed mood and anxiety. He is as independent in basic and instrumental activities of daily living as he has been historically.     Neuropsychological assessment results were interpreted in the context of estimated average premorbid abilities and variable performance validity. Within that context, he demonstrated relatively reduced or reduced attention and working memory when repeating, reversing, and sequencing digits and when solving mental arithmetic problems. Visuomotor processing speed was largely within expectation on tasks with a less significant motor component, with greater difficulty on a complex processing speed task with a motor component. He demonstrated difficulty on executive functioning measures assessing set shifting, verbal abstract reasoning, and phonemic fluency; semantic fluency, nonverbal abstract reasoning, and motor programming were intact. Language performance was notable for poor reading, consistent with report of a learning disability. Naming was reduced but improved with cuing. Visuospatial functioning was largely consistent with premorbid expectations. Fine motor speed and dexterity were intact bilaterally, with stronger performance using his dominant, right hand. Learning and recall of a word list was reduced, but performance improved with use of a recognition format. Learning of verbal information given in " "context was broadly consistent with premorbid estimates, and he retained a significant amount of information over time, with intact recognition. Learning of visual information was reduced, with reduced retention but 100% retention of learned information. Recognition was intact. He endorsed minimal depression and mild anxiety on self-report measures that were read to him.     In sum, Mr. Ferrara exhibited changes in cognition from his estimated baseline. Taken with report of cognitive changes in daily life but performance of activities of daily living at the level of independence he has displayed historically, a diagnosis of mild neurocognitive disorder appears warranted. Patterns of performance on testing were largely frontal/subcortical in nature, with relatively intact visuospatial functioning. Etiology is likely multifactorial, with contribution from NPH and vascular risk factors, as well as parkinsonism."              Assessment and Plan:    Problem List Items Addressed This Visit          Neuro    Mild Cognitive Impairment - Primary    Current Assessment & Plan   Reported to have cognitive impairment by previous neurologist. Patient reports increased forgetfulness and occasional word finding difficulty  Little improvement in cognition following shunt placement  No reports of behavorial changes, hallucinations. He does have a history of depression and takes Celexa but unsure if this offers any aid now. He also has been diagnosed with PLMS as well as RBD.   MMSE today 28/30; stable   - a Dx of MCI warranted as per recent NP testing; etiology multifactorial (NPH, PD-ism and vascular r/f)  MRI brain scan noted with chronic lacune and mod microvascular ischemic changes; no acute hydrocephalus   Serologies noted  Repeat NP testing in 12-18 mos  Pt was tried on Aricept in the past but was intolerable to it (vivid dreams)  Now maintained on Namenda   - continue   Obtain PSG due to reports of worsening RBD and excessive " daytime sleepiness  Safety concerns addressed.   OT driving eval recommended as per NP testing   - new referral placed          Normal Pressure Hydrocephalus S/P  Shunt In 05/2024    Current Assessment & Plan   Starting PT             Other    PLMD (periodic limb movement disorder)    Current Assessment & Plan   Has been tried on CD/LD and klonopin.           Other Visit Diagnoses         Sleep apnea, unspecified type                      Important to note, also  has a past medical history of a Family H/O Early CAD, a Mild Bilateral Carotid Artery Stenosis, c Hypercholesterolemia, d Family H/O Diabetes Mellitus, f Overweight, j Diverticulosis, j H/O Colon Polyp On 2019 Colonoscopy, k Benign Prostatic Hyperplasia, m Impaired balance, m Mild Cognitive Impairment, m Normal Pressure Hydrocephalus S/P  Shunt In 05/2024, m Restless Legs Syndrome, m Right Hand Tremor, n Dysthymia, q Eczema, and Wellness examination 12/12/2024.          The patient will return to clinic in 6 months.    All questions were answered and patient is comfortable with the plan.         Thank you very much for the opportunity to assist in this patient's care.    If you have any questions or concerns, please do not hesitate to contact me at any time.      Sincerely,     JUAN CARLOS Tolbert  [facility] [place]

## 2025-05-15 PROBLEM — R26.81 GAIT INSTABILITY: Status: ACTIVE | Noted: 2025-05-15

## 2025-05-21 DIAGNOSIS — G47.61 PLMD (PERIODIC LIMB MOVEMENT DISORDER): Primary | ICD-10-CM

## 2025-06-13 PROBLEM — E78.5 HYPERLIPIDEMIA: Status: ACTIVE | Noted: 2025-06-13

## 2025-06-16 ENCOUNTER — PATIENT MESSAGE (OUTPATIENT)
Dept: NEUROLOGY | Facility: CLINIC | Age: 75
End: 2025-06-16
Payer: MEDICARE

## 2025-06-16 ENCOUNTER — TELEPHONE (OUTPATIENT)
Dept: NEUROLOGY | Facility: CLINIC | Age: 75
End: 2025-06-16
Payer: MEDICARE